# Patient Record
Sex: FEMALE | Race: WHITE | NOT HISPANIC OR LATINO | Employment: OTHER | URBAN - METROPOLITAN AREA
[De-identification: names, ages, dates, MRNs, and addresses within clinical notes are randomized per-mention and may not be internally consistent; named-entity substitution may affect disease eponyms.]

---

## 2017-08-07 ENCOUNTER — TRANSCRIBE ORDERS (OUTPATIENT)
Dept: ADMINISTRATIVE | Facility: HOSPITAL | Age: 57
End: 2017-08-07

## 2017-08-07 DIAGNOSIS — Z12.31 VISIT FOR SCREENING MAMMOGRAM: Primary | ICD-10-CM

## 2017-08-10 ENCOUNTER — TRANSCRIBE ORDERS (OUTPATIENT)
Dept: LAB | Facility: HOSPITAL | Age: 57
End: 2017-08-10

## 2017-08-10 ENCOUNTER — LAB (OUTPATIENT)
Dept: LAB | Facility: HOSPITAL | Age: 57
End: 2017-08-10

## 2017-08-10 DIAGNOSIS — Z01.419 PAP SMEAR, LOW-RISK: ICD-10-CM

## 2017-08-10 DIAGNOSIS — Z01.419 PAP SMEAR, LOW-RISK: Primary | ICD-10-CM

## 2017-08-10 LAB
25(OH)D3 SERPL-MCNC: 76.8 NG/ML
ARTICHOKE IGE QN: 94 MG/DL (ref 0–130)
CHOLEST SERPL-MCNC: 190 MG/DL (ref 0–200)
DEPRECATED RDW RBC AUTO: 46.9 FL (ref 37–54)
ERYTHROCYTE [DISTWIDTH] IN BLOOD BY AUTOMATED COUNT: 13.6 % (ref 11.3–14.5)
GLUCOSE BLD-MCNC: 88 MG/DL (ref 70–100)
HCT VFR BLD AUTO: 42.4 % (ref 34.5–44)
HDLC SERPL-MCNC: 78 MG/DL (ref 40–60)
HGB BLD-MCNC: 14 G/DL (ref 11.5–15.5)
MCH RBC QN AUTO: 31.2 PG (ref 27–31)
MCHC RBC AUTO-ENTMCNC: 33 G/DL (ref 32–36)
MCV RBC AUTO: 94.4 FL (ref 80–99)
PLATELET # BLD AUTO: 211 10*3/MM3 (ref 150–450)
PMV BLD AUTO: 10.6 FL (ref 6–12)
RBC # BLD AUTO: 4.49 10*6/MM3 (ref 3.89–5.14)
TRIGL SERPL-MCNC: 127 MG/DL (ref 0–150)
TSH SERPL DL<=0.05 MIU/L-ACNC: 1.55 MIU/ML (ref 0.35–5.35)
WBC NRBC COR # BLD: 6.37 10*3/MM3 (ref 3.5–10.8)

## 2017-08-10 PROCEDURE — 36415 COLL VENOUS BLD VENIPUNCTURE: CPT | Performed by: OBSTETRICS & GYNECOLOGY

## 2017-08-10 PROCEDURE — 82306 VITAMIN D 25 HYDROXY: CPT | Performed by: OBSTETRICS & GYNECOLOGY

## 2017-08-10 PROCEDURE — 84443 ASSAY THYROID STIM HORMONE: CPT | Performed by: OBSTETRICS & GYNECOLOGY

## 2017-08-10 PROCEDURE — 85027 COMPLETE CBC AUTOMATED: CPT | Performed by: OBSTETRICS & GYNECOLOGY

## 2017-08-10 PROCEDURE — 82947 ASSAY GLUCOSE BLOOD QUANT: CPT | Performed by: OBSTETRICS & GYNECOLOGY

## 2017-08-10 PROCEDURE — 80061 LIPID PANEL: CPT | Performed by: OBSTETRICS & GYNECOLOGY

## 2017-08-11 ENCOUNTER — APPOINTMENT (OUTPATIENT)
Dept: MAMMOGRAPHY | Facility: HOSPITAL | Age: 57
End: 2017-08-11
Attending: OBSTETRICS & GYNECOLOGY

## 2017-08-14 ENCOUNTER — TRANSCRIBE ORDERS (OUTPATIENT)
Dept: MAMMOGRAPHY | Facility: HOSPITAL | Age: 57
End: 2017-08-14

## 2017-08-14 DIAGNOSIS — Z13.820 SCREENING FOR OSTEOPOROSIS: Primary | ICD-10-CM

## 2017-08-21 ENCOUNTER — HOSPITAL ENCOUNTER (OUTPATIENT)
Dept: BONE DENSITY | Facility: HOSPITAL | Age: 57
Discharge: HOME | End: 2017-08-21
Attending: OBSTETRICS & GYNECOLOGY

## 2017-08-21 ENCOUNTER — APPOINTMENT (OUTPATIENT)
Dept: MAMMOGRAPHY | Facility: HOSPITAL | Age: 57
End: 2017-08-21
Attending: OBSTETRICS & GYNECOLOGY

## 2017-08-21 ENCOUNTER — HOSPITAL ENCOUNTER (OUTPATIENT)
Dept: MAMMOGRAPHY | Facility: HOSPITAL | Age: 57
Discharge: HOME | End: 2017-08-21
Attending: OBSTETRICS & GYNECOLOGY | Admitting: OBSTETRICS & GYNECOLOGY

## 2017-08-21 DIAGNOSIS — Z13.820 SCREENING FOR OSTEOPOROSIS: ICD-10-CM

## 2017-08-21 DIAGNOSIS — Z12.31 VISIT FOR SCREENING MAMMOGRAM: ICD-10-CM

## 2017-08-21 PROCEDURE — 77063 BREAST TOMOSYNTHESIS BI: CPT

## 2017-08-21 PROCEDURE — 77063 BREAST TOMOSYNTHESIS BI: CPT | Performed by: RADIOLOGY

## 2017-08-21 PROCEDURE — G0202 SCR MAMMO BI INCL CAD: HCPCS

## 2017-08-21 PROCEDURE — 77067 SCR MAMMO BI INCL CAD: CPT | Performed by: RADIOLOGY

## 2017-08-21 PROCEDURE — 77080 DXA BONE DENSITY AXIAL: CPT

## 2018-07-03 ENCOUNTER — TRANSCRIBE ORDERS (OUTPATIENT)
Dept: ADMINISTRATIVE | Facility: HOSPITAL | Age: 58
End: 2018-07-03

## 2018-07-03 DIAGNOSIS — Z12.31 VISIT FOR SCREENING MAMMOGRAM: Primary | ICD-10-CM

## 2018-08-28 ENCOUNTER — TRANSCRIBE ORDERS (OUTPATIENT)
Dept: ADMINISTRATIVE | Facility: HOSPITAL | Age: 58
End: 2018-08-28

## 2018-08-28 DIAGNOSIS — Z12.31 VISIT FOR SCREENING MAMMOGRAM: Primary | ICD-10-CM

## 2018-09-28 ENCOUNTER — HOSPITAL ENCOUNTER (OUTPATIENT)
Dept: MAMMOGRAPHY | Facility: HOSPITAL | Age: 58
Discharge: HOME | End: 2018-09-28
Attending: OBSTETRICS & GYNECOLOGY | Admitting: OBSTETRICS & GYNECOLOGY

## 2018-09-28 DIAGNOSIS — Z12.31 VISIT FOR SCREENING MAMMOGRAM: ICD-10-CM

## 2018-09-28 PROCEDURE — 77067 SCR MAMMO BI INCL CAD: CPT

## 2018-09-28 PROCEDURE — 77063 BREAST TOMOSYNTHESIS BI: CPT | Performed by: RADIOLOGY

## 2018-09-28 PROCEDURE — 77063 BREAST TOMOSYNTHESIS BI: CPT

## 2018-09-28 PROCEDURE — 77067 SCR MAMMO BI INCL CAD: CPT | Performed by: RADIOLOGY

## 2019-01-09 DIAGNOSIS — Z12.11 SCREENING FOR COLON CANCER: Primary | ICD-10-CM

## 2019-01-17 ENCOUNTER — OUTSIDE FACILITY SERVICE (OUTPATIENT)
Dept: GASTROENTEROLOGY | Facility: CLINIC | Age: 59
End: 2019-01-17

## 2019-01-17 PROCEDURE — G0121 COLON CA SCRN NOT HI RSK IND: HCPCS | Performed by: INTERNAL MEDICINE

## 2019-02-26 ENCOUNTER — OFFICE VISIT (OUTPATIENT)
Dept: INTERNAL MEDICINE | Facility: CLINIC | Age: 59
End: 2019-02-26

## 2019-02-26 VITALS
HEIGHT: 63 IN | DIASTOLIC BLOOD PRESSURE: 66 MMHG | BODY MASS INDEX: 20.73 KG/M2 | SYSTOLIC BLOOD PRESSURE: 94 MMHG | TEMPERATURE: 97.7 F | HEART RATE: 80 BPM | WEIGHT: 117 LBS

## 2019-02-26 DIAGNOSIS — J10.1 INFLUENZA A: Primary | ICD-10-CM

## 2019-02-26 DIAGNOSIS — R52 BODY ACHES: ICD-10-CM

## 2019-02-26 LAB
EXPIRATION DATE: ABNORMAL
FLUAV AG NPH QL: POSITIVE
FLUBV AG NPH QL: ABNORMAL
INTERNAL CONTROL: ABNORMAL
Lab: ABNORMAL

## 2019-02-26 PROCEDURE — 99213 OFFICE O/P EST LOW 20 MIN: CPT | Performed by: NURSE PRACTITIONER

## 2019-02-26 PROCEDURE — 87502 INFLUENZA DNA AMP PROBE: CPT | Performed by: NURSE PRACTITIONER

## 2019-02-26 RX ORDER — CHLORAL HYDRATE 500 MG
1000 CAPSULE ORAL
COMMUNITY

## 2019-02-26 RX ORDER — OSELTAMIVIR PHOSPHATE 75 MG/1
75 CAPSULE ORAL 2 TIMES DAILY
Qty: 10 CAPSULE | Refills: 0 | OUTPATIENT
Start: 2019-02-26 | End: 2019-03-12

## 2019-02-26 NOTE — PROGRESS NOTES
Miriam Godoy  1960  [id number]  Patient Care Team:  Bishun Riddle MD as PCP - General (Internal Medicine)    Miriam Godoy is a pleasant 58 y.o. female who presents for evaluation of Cough (productive cough with yellow sputum); Chills; and Generalized Body Aches (started this morning )      Chief Complaint   Patient presents with   • Cough     productive cough with yellow sputum   • Chills   • Generalized Body Aches     started this morning        HPI:   Onset of flu like sx this am including joint pain, productive cough, body aches.  Took 1 motrin today    Past Medical History:   Diagnosis Date   • Aortic root dilatation (CMS/HCC)    • Atypical chest pain    • History of echocardiogram 2015    showing estimated EF 55% to 60% with mild aortic regurgitation and moderate dilatation of the aortic root   • History of EKG     normal   • History of palpitations    • Osteopenia        Past Surgical History:   Procedure Laterality Date   • APPENDECTOMY     • OOPHORECTOMY Bilateral        Family History   Problem Relation Age of Onset   • Breast cancer Mother 43   • Breast cancer Paternal Grandmother 47   • Ovarian cancer Neg Hx        Social History     Tobacco Use   Smoking Status Former Smoker   • Types: Cigarettes   • Last attempt to quit:    • Years since quittin.1   Smokeless Tobacco Never Used       Allergies   Allergen Reactions   • Penicillins Rash       Review of Systems   Constitutional: Positive for chills. Negative for fatigue and fever.   HENT: Positive for congestion and sinus pressure. Negative for ear pain.    Respiratory: Positive for cough. Negative for chest tightness, shortness of breath and wheezing.    Cardiovascular: Negative for chest pain and palpitations.   Gastrointestinal: Negative for abdominal pain, blood in stool and constipation.   Skin: Negative for color change.   Allergic/Immunologic: Positive for environmental allergies.   Neurological: Negative for  "dizziness, speech difficulty and headache.   Psychiatric/Behavioral: Negative for decreased concentration. The patient is not nervous/anxious.        Vitals:    02/26/19 1550   BP: 94/66   BP Location: Left arm   Patient Position: Sitting   Cuff Size: Adult   Pulse: 80   Temp: 97.7 °F (36.5 °C)   TempSrc: Temporal   Weight: 53.1 kg (117 lb)   Height: 160 cm (62.99\")   PainSc: 0-No pain         Current Outpatient Medications:   •  BIOTIN PO, Take 1 tablet by mouth Daily., Disp: , Rfl:   •  CALCIUM-MAGNESIUM-ZINC PO, Take 1 tablet by mouth Daily., Disp: , Rfl:   •  Cholecalciferol (VITAMIN D-3) 5000 units tablet, Take 1 tablet by mouth Daily., Disp: , Rfl:   •  Omega-3 Fatty Acids (FISH OIL) 1000 MG capsule capsule, Take 1,000 mg by mouth Daily With Breakfast., Disp: , Rfl:   •  oseltamivir (TAMIFLU) 75 MG capsule, Take 1 capsule by mouth 2 (Two) Times a Day., Disp: 10 capsule, Rfl: 0    Physical Exam   Constitutional: She appears well-developed and well-nourished. She appears ill.   Pulmonary/Chest: Effort normal.   Skin: Skin is warm and dry.   Psychiatric: She has a normal mood and affect. Her behavior is normal.   Vitals reviewed.      Results Review:    I reviewed the patient's new clinical results.    Procedures    Assessment/Plan:    Problem List Items Addressed This Visit     None      Visit Diagnoses     Influenza A    -  Primary    Relevant Medications    oseltamivir (TAMIFLU) 75 MG capsule    Body aches        Relevant Orders    POCT Influenza A/B (Completed)          Plan of care reviewed with patient at the conclusion of today's visit. Education was provided regarding diagnosis, management and any prescribed or recommended OTC medications.  Patient verbalizes understanding of and agreement with management plan.    Return if symptoms worsen or fail to improve.    *Note that portions of this note were completed with a voice recognition program.  Efforts were made to edit the dictation but occasionally words " are transcribed.    Sarah Abreu, APRN

## 2019-03-12 PROCEDURE — 87086 URINE CULTURE/COLONY COUNT: CPT | Performed by: PHYSICIAN ASSISTANT

## 2019-03-14 ENCOUNTER — TELEPHONE (OUTPATIENT)
Dept: URGENT CARE | Facility: CLINIC | Age: 59
End: 2019-03-14

## 2019-03-21 ENCOUNTER — OFFICE VISIT (OUTPATIENT)
Dept: INTERNAL MEDICINE | Facility: CLINIC | Age: 59
End: 2019-03-21

## 2019-03-21 VITALS
TEMPERATURE: 96.8 F | WEIGHT: 116 LBS | HEIGHT: 63 IN | DIASTOLIC BLOOD PRESSURE: 62 MMHG | SYSTOLIC BLOOD PRESSURE: 90 MMHG | BODY MASS INDEX: 20.55 KG/M2 | HEART RATE: 64 BPM

## 2019-03-21 DIAGNOSIS — R21 RASH: Primary | ICD-10-CM

## 2019-03-21 DIAGNOSIS — R05.9 COUGH: ICD-10-CM

## 2019-03-21 PROCEDURE — 99213 OFFICE O/P EST LOW 20 MIN: CPT | Performed by: PHYSICIAN ASSISTANT

## 2019-03-21 PROCEDURE — 96372 THER/PROPH/DIAG INJ SC/IM: CPT | Performed by: PHYSICIAN ASSISTANT

## 2019-03-21 RX ORDER — TRIAMCINOLONE ACETONIDE 40 MG/ML
60 INJECTION, SUSPENSION INTRA-ARTICULAR; INTRAMUSCULAR ONCE
Status: COMPLETED | OUTPATIENT
Start: 2019-03-21 | End: 2019-03-21

## 2019-03-21 RX ADMIN — TRIAMCINOLONE ACETONIDE 60 MG: 40 INJECTION, SUSPENSION INTRA-ARTICULAR; INTRAMUSCULAR at 13:19

## 2019-03-21 NOTE — PROGRESS NOTES
Subjective   Miriam Godoy is a 58 y.o. female.     Past Medical History:   Diagnosis Date   • Aortic root dilatation (CMS/HCC)    • Atypical chest pain    • History of echocardiogram 2015    showing estimated EF 55% to 60% with mild aortic regurgitation and moderate dilatation of the aortic root   • History of EKG     normal   • History of palpitations    • Osteopenia       Past Surgical History:   Procedure Laterality Date   • APPENDECTOMY     • OOPHORECTOMY Bilateral       Family History   Problem Relation Age of Onset   • Breast cancer Mother 43   • Breast cancer Paternal Grandmother 47   • Ovarian cancer Neg Hx       Social History     Socioeconomic History   • Marital status: Single     Spouse name: Not on file   • Number of children: Not on file   • Years of education: Not on file   • Highest education level: Not on file   Tobacco Use   • Smoking status: Former Smoker     Types: Cigarettes     Last attempt to quit: 1981     Years since quittin.2   • Smokeless tobacco: Never Used   Substance and Sexual Activity   • Alcohol use: Yes     Comment: rare   • Drug use: No   • Sexual activity: Defer      Allergies   Allergen Reactions   • Penicillins Rash      Immunization History   Administered Date(s) Administered   • Flu Vaccine High Dose PF 65YR+ 2016   • Flu Vaccine Quad PF >36MO 2017   • Tdap 2016        HPI  Patient Active Problem List   Diagnosis   • Premature ventricular contractions (PVCs) (VPCs)       Rash   This is a new problem. The current episode started yesterday. The problem is unchanged. The affected locations include the left arm, left lower leg, right wrist and left wrist. The rash is characterized by redness. She was exposed to chemicals. Associated symptoms include congestion, coughing, rhinorrhea and a sore throat. Pertinent negatives include no fatigue, fever or shortness of breath. Past treatments include nothing. Her past medical history is significant for  allergies.   Cough   This is a recurrent problem. The current episode started 1 to 4 weeks ago. The problem has been unchanged. The cough is non-productive. Associated symptoms include postnasal drip, a rash, rhinorrhea, a sore throat and wheezing. Pertinent negatives include no chest pain, chills, ear pain, fever or shortness of breath. The treatment provided no relief. Her past medical history is significant for environmental allergies.        Review of Systems   Constitutional: Negative for chills, fatigue and fever.   HENT: Positive for congestion, postnasal drip, rhinorrhea, sneezing, sore throat and voice change. Negative for ear pain and sinus pressure.    Respiratory: Positive for cough and wheezing. Negative for chest tightness and shortness of breath.    Cardiovascular: Negative for chest pain and palpitations.   Gastrointestinal: Negative for abdominal pain, blood in stool and constipation.   Skin: Positive for rash. Negative for color change.   Allergic/Immunologic: Positive for environmental allergies.   Neurological: Negative for dizziness, speech difficulty and headache.   Psychiatric/Behavioral: Negative for decreased concentration. The patient is not nervous/anxious.        Objective   Physical Exam   Constitutional: She appears well-developed and well-nourished. No distress.   HENT:   Head: Normocephalic and atraumatic.   Nose: Nose normal.   Mouth/Throat: Oropharynx is clear and moist.   Eyes: Conjunctivae and EOM are normal. Pupils are equal, round, and reactive to light.   Neck: Normal range of motion. Neck supple. No JVD present.   Cardiovascular: Normal rate, regular rhythm and normal heart sounds.   No murmur heard.  Pulmonary/Chest: Effort normal. No stridor. No respiratory distress. She has wheezes.   Musculoskeletal: She exhibits no edema.   Lymphadenopathy:     She has no cervical adenopathy.   Skin: Skin is warm. Rash noted. She is not diaphoretic.   Psychiatric: She has a normal mood  and affect. Her behavior is normal. Thought content normal.   Nursing note and vitals reviewed.      Procedures    Assessment/Plan   Problem List Items Addressed This Visit     None      Visit Diagnoses     Rash    -  Primary    Relevant Medications    triamcinolone acetonide (KENALOG-40) injection 60 mg (Start on 3/21/2019  2:00 PM)    Cough        Relevant Medications    triamcinolone acetonide (KENALOG-40) injection 60 mg (Start on 3/21/2019  2:00 PM)                 Pt's social, medical, and family history reviewed and updated.    Plan of care reviewed with patient at the conclusion of today's visit. Education was provided regarding diagnosis, management and any prescribed or recommended OTC medications. Patient verbalizes understanding of and agreement with management plan.     Return if symptoms worsen or fail to improve.

## 2019-04-15 PROBLEM — H00.029 HORDEOLUM INTERNUM: Status: ACTIVE | Noted: 2019-04-15

## 2019-04-15 PROBLEM — Z00.00 ANNUAL PHYSICAL EXAM: Status: ACTIVE | Noted: 2019-04-15

## 2019-04-15 PROBLEM — J34.89 INTERNAL NASAL LESION: Status: ACTIVE | Noted: 2019-04-15

## 2019-04-15 PROBLEM — J30.9 CHRONIC ALLERGIC RHINITIS: Status: ACTIVE | Noted: 2019-04-15

## 2019-04-15 PROBLEM — R21 RASH: Status: ACTIVE | Noted: 2019-04-15

## 2019-04-17 ENCOUNTER — OFFICE VISIT (OUTPATIENT)
Dept: INTERNAL MEDICINE | Facility: CLINIC | Age: 59
End: 2019-04-17

## 2019-04-17 VITALS
BODY MASS INDEX: 20.55 KG/M2 | WEIGHT: 116 LBS | DIASTOLIC BLOOD PRESSURE: 58 MMHG | SYSTOLIC BLOOD PRESSURE: 90 MMHG | HEIGHT: 63 IN | HEART RATE: 60 BPM

## 2019-04-17 DIAGNOSIS — R07.89 ATYPICAL CHEST PAIN: Primary | ICD-10-CM

## 2019-04-17 PROCEDURE — 99213 OFFICE O/P EST LOW 20 MIN: CPT | Performed by: INTERNAL MEDICINE

## 2019-04-17 NOTE — PROGRESS NOTES
[facility]     Miriam Godoy  1960   [id number]      Patient Care Team:  Bishnu Riddle MD as PCP - General (Internal Medicine)    Chief Complaint::   Chief Complaint   Patient presents with   • Allergic Rhinitis   • Pain     neck, chest, arm, leg - left side   • Weight Loss   • Urinary Frequency        HPI  Mrs. Godoy is now 58 years old.  She comes in concerned about 3 episodes of chest discomfort.  One occurred while she was driving her car, beginning in the left neck and shoulder and then extending into the chest.  There is no shortness of breath lightheadedness.  The pain was sharp and transient.  The second episode occurred while she was in bed and again was somewhat sharp, and then a dull ache, again without shortness of breath.  The third episode was similar again occurring at rest.  She admits that she is under a lot of stress.  She was stressed during this.  Getting her grandchildren ready to go for spring break.  Despite this she continues to train for a 5K, and is able to exercise vigorously most days without any symptoms of chest pain or dyspnea.  There is no orthopnea or edema however she does have a fairly strong family history of coronary artery disease.    Chronic Conditions:      Patient Active Problem List   Diagnosis   • Premature ventricular contractions (PVCs) (VPCs)   • Rash   • Chronic allergic rhinitis   • Internal nasal lesion   • Hordeolum internum   • Annual physical exam        Past Medical History:   Diagnosis Date   • Aortic root dilatation (CMS/HCC)    • Atypical chest pain    • History of echocardiogram 04/16/2015    showing estimated EF 55% to 60% with mild aortic regurgitation and moderate dilatation of the aortic root   • History of EKG     normal   • History of palpitations    • Osteopenia        Past Surgical History:   Procedure Laterality Date   • APPENDECTOMY     • OOPHORECTOMY Bilateral        Family History   Problem Relation Age of Onset   •  Breast cancer Mother 43   • Breast cancer Paternal Grandmother 47   • Coronary artery disease Father 57        premature   • Ovarian cancer Neg Hx        Social History     Socioeconomic History   • Marital status: Single     Spouse name: Not on file   • Number of children: Not on file   • Years of education: Not on file   • Highest education level: Not on file   Tobacco Use   • Smoking status: Former Smoker     Types: Cigarettes     Last attempt to quit:      Years since quittin.3   • Smokeless tobacco: Never Used   Substance and Sexual Activity   • Alcohol use: Yes     Comment: rare   • Drug use: No   • Sexual activity: Defer       Allergies   Allergen Reactions   • Penicillins Rash         Current Outpatient Medications:   •  BIOTIN PO, Take 1 tablet by mouth Daily., Disp: , Rfl:   •  CALCIUM-MAGNESIUM-ZINC PO, Take 1 tablet by mouth Daily., Disp: , Rfl:   •  Cholecalciferol (VITAMIN D-3) 5000 units tablet, Take 1 tablet by mouth Daily., Disp: , Rfl:   •  Multiple Vitamins-Minerals (MULTIVITAMIN ADULT PO), Take 1 tablet by mouth Daily., Disp: , Rfl:   •  Omega-3 Fatty Acids (FISH OIL) 1000 MG capsule capsule, Take 1,000 mg by mouth Daily With Breakfast., Disp: , Rfl:     Review of Systems   Constitutional: Negative for chills, fatigue and fever.   HENT: Negative for congestion, ear pain and sinus pressure.    Respiratory: Negative for cough, chest tightness, shortness of breath and wheezing.    Cardiovascular: Negative for chest pain and palpitations.   Gastrointestinal: Negative for abdominal pain, blood in stool and constipation.   Skin: Negative for color change.   Allergic/Immunologic: Negative for environmental allergies.   Neurological: Negative for dizziness, speech difficulty and headache.   Psychiatric/Behavioral: Negative for decreased concentration. The patient is not nervous/anxious.         Vital Signs  Vitals:    19 1541   BP: 90/58   BP Location: Right arm   Patient Position: Sitting  "  Cuff Size: Adult   Pulse: 60   Weight: 52.6 kg (116 lb)   Height: 160 cm (62.99\")       Physical Exam   Constitutional: She is oriented to person, place, and time. She appears well-developed and well-nourished.   HENT:   Head: Normocephalic and atraumatic.   Neck: Normal range of motion. Neck supple. No JVD present. No thyromegaly present.   Cardiovascular: Normal rate, regular rhythm and normal heart sounds.   No murmur heard.  Pulmonary/Chest: Effort normal and breath sounds normal.   Neurological: She is alert and oriented to person, place, and time.   Psychiatric: She has a normal mood and affect.   Vitals reviewed.     Procedures    ACE III MINI             Assessment/Plan:    Atypical chest pain, occurring without shortness of breath and at rest.  In addition, she exercises vigorously at other times without any symptoms or limitations.  Given these historical details I think it is extremely unlikely that this represents coronary artery disease.  We discussed this at length and I agree that a stress test at this point would be very low yield.  However if symptoms worsen or persist, would not hesitate to proceed to cardiac workup.    Plan of care reviewed with patient at the conclusion of today's visit. Education was provided regarding diagnosis, management, and any prescribed or recommended OTC medications.Patient verbalizes understanding of and agreement with management plan.         Bishnu Riddle MD           "

## 2019-06-05 ENCOUNTER — TRANSCRIBE ORDERS (OUTPATIENT)
Dept: ADMINISTRATIVE | Facility: HOSPITAL | Age: 59
End: 2019-06-05

## 2019-06-05 DIAGNOSIS — Z12.31 VISIT FOR SCREENING MAMMOGRAM: Primary | ICD-10-CM

## 2019-09-11 ENCOUNTER — OFFICE VISIT (OUTPATIENT)
Dept: INTERNAL MEDICINE | Facility: CLINIC | Age: 59
End: 2019-09-11

## 2019-09-11 VITALS
HEIGHT: 63 IN | HEART RATE: 64 BPM | DIASTOLIC BLOOD PRESSURE: 60 MMHG | BODY MASS INDEX: 20.55 KG/M2 | WEIGHT: 116 LBS | SYSTOLIC BLOOD PRESSURE: 106 MMHG

## 2019-09-11 DIAGNOSIS — E55.9 VITAMIN D DEFICIENCY: Primary | ICD-10-CM

## 2019-09-11 DIAGNOSIS — J30.9 CHRONIC ALLERGIC RHINITIS: ICD-10-CM

## 2019-09-11 DIAGNOSIS — Z00.00 ANNUAL PHYSICAL EXAM: ICD-10-CM

## 2019-09-11 DIAGNOSIS — I49.3 PREMATURE VENTRICULAR CONTRACTIONS (PVCS) (VPCS): ICD-10-CM

## 2019-09-11 DIAGNOSIS — Z13.220 SCREENING FOR CHOLESTEROL LEVEL: ICD-10-CM

## 2019-09-11 DIAGNOSIS — Z13.228 ENCOUNTER FOR SCREENING FOR OTHER METABOLIC DISORDERS: ICD-10-CM

## 2019-09-11 PROCEDURE — 99396 PREV VISIT EST AGE 40-64: CPT | Performed by: INTERNAL MEDICINE

## 2019-09-11 NOTE — PROGRESS NOTES
[facility]     Miriam Godoy  1960   [id number]      Patient Care Team:  Bishnu Riddle MD as PCP - General (Internal Medicine)    Chief Complaint::   Chief Complaint   Patient presents with   • Annual Exam        HPI  Mrs. Godoy is now 58.  She comes in for follow-up of her PVCs and for annual examination.  She feels well and has no complaints.  She continues to exercise regularly.  She sees her gynecologist and is up-to-date on mammography and colonoscopy.  She still meditates daily.  She was recently found to have small uterine fibroids which cause some discomfort but not limiting.  She has no other complaints today.    Chronic Conditions:      Patient Active Problem List   Diagnosis   • Premature ventricular contractions (PVCs) (VPCs)   • Rash   • Chronic allergic rhinitis   • Internal nasal lesion   • Hordeolum internum   • Annual physical exam        Past Medical History:   Diagnosis Date   • Aortic root dilatation (CMS/HCC)    • Atypical chest pain    • History of echocardiogram 2015    showing estimated EF 55% to 60% with mild aortic regurgitation and moderate dilatation of the aortic root   • History of EKG     normal   • History of palpitations    • Osteopenia        Past Surgical History:   Procedure Laterality Date   • APPENDECTOMY     • OOPHORECTOMY Bilateral        Family History   Problem Relation Age of Onset   • Breast cancer Mother 43   • Breast cancer Paternal Grandmother 47   • Coronary artery disease Father 57        premature   • Ovarian cancer Neg Hx        Social History     Socioeconomic History   • Marital status: Single     Spouse name: Not on file   • Number of children: Not on file   • Years of education: Not on file   • Highest education level: Not on file   Tobacco Use   • Smoking status: Former Smoker     Types: Cigarettes     Last attempt to quit:      Years since quittin.7   • Smokeless tobacco: Never Used   Substance and Sexual Activity  "  • Alcohol use: Yes     Comment: rare   • Drug use: No   • Sexual activity: Defer       Allergies   Allergen Reactions   • Penicillins Rash         Current Outpatient Medications:   •  BIOTIN PO, Take 1 tablet by mouth Daily., Disp: , Rfl:   •  CALCIUM-MAGNESIUM-ZINC PO, Take 1 tablet by mouth Daily., Disp: , Rfl:   •  Multiple Vitamins-Minerals (MULTIVITAMIN ADULT PO), Take 1 tablet by mouth Daily., Disp: , Rfl:   •  Omega-3 Fatty Acids (FISH OIL) 1000 MG capsule capsule, Take 1,000 mg by mouth Daily With Breakfast., Disp: , Rfl:   •  Cholecalciferol (VITAMIN D-3) 5000 units tablet, Take 1 tablet by mouth Daily., Disp: , Rfl:     Immunization History   Administered Date(s) Administered   • Flu Vaccine High Dose PF 65YR+ 01/05/2016   • Flu Vaccine Quad PF >36MO 09/06/2017, 12/17/2018   • Hepatitis A 02/17/2018, 08/17/2018   • Tdap 01/05/2016        Health Maintenance Due   Topic Date Due   • ZOSTER VACCINE (1 of 2) 11/06/2008   • HEPATITIS C SCREENING  08/01/2016   • PAP SMEAR  08/01/2016   • INFLUENZA VACCINE  08/01/2019   • DXA SCAN  08/21/2019   • ANNUAL PHYSICAL  09/11/2019        Review of Systems   Constitutional: Negative for chills, fatigue and fever.   HENT: Negative for congestion, ear pain and sinus pressure.    Respiratory: Negative for cough, chest tightness, shortness of breath and wheezing.    Cardiovascular: Negative for chest pain and palpitations.   Gastrointestinal: Negative for abdominal pain, blood in stool and constipation.   Skin: Negative for color change.   Allergic/Immunologic: Negative for environmental allergies.   Neurological: Negative for dizziness, speech difficulty and headache.   Psychiatric/Behavioral: Negative for decreased concentration. The patient is not nervous/anxious.         Vital Signs  Vitals:    09/11/19 1622   BP: 106/60   BP Location: Left arm   Patient Position: Sitting   Cuff Size: Adult   Pulse: 64   Weight: 52.6 kg (116 lb)   Height: 160 cm (62.99\")   PainSc:   4 "   PainLoc: Neck       Physical Exam   Constitutional: She is oriented to person, place, and time. She appears well-developed and well-nourished.   HENT:   Head: Normocephalic and atraumatic.   Right Ear: External ear normal.   Left Ear: External ear normal.   Nose: Nose normal.   Mouth/Throat: Oropharynx is clear and moist. No oropharyngeal exudate.   Eyes: Conjunctivae and EOM are normal. Pupils are equal, round, and reactive to light.   Neck: Normal range of motion. Neck supple. No JVD present. No thyromegaly present.   Cardiovascular: Normal rate, regular rhythm, normal heart sounds and intact distal pulses. Exam reveals no gallop and no friction rub.   No murmur heard.  Pulmonary/Chest: Effort normal and breath sounds normal. No respiratory distress. She has no wheezes. She has no rales. She exhibits no tenderness.   Abdominal: Soft. Bowel sounds are normal. She exhibits no distension and no mass. There is no tenderness. There is no rebound and no guarding. No hernia.   Musculoskeletal: Normal range of motion. She exhibits no edema or tenderness.   Lymphadenopathy:     She has no cervical adenopathy.   Neurological: She is alert and oriented to person, place, and time. She displays normal reflexes. No cranial nerve deficit or sensory deficit. She exhibits normal muscle tone. Coordination normal.   Skin: Skin is warm and dry. No rash noted. No erythema.   Psychiatric: She has a normal mood and affect. Her behavior is normal. Judgment and thought content normal.   Nursing note and vitals reviewed.     Procedures     Fall Risk Screen:  [facility] Fall Risk Assessment has not been completed.    Health Habits and Functional and Cognitive Screening:  No flowsheet data found.    Depression [place]  PHQ-9 Total Score: 0     ACE III MINI             Assessment/Plan:    Miriam was seen today for annual exam.    Diagnoses and all orders for this visit:    Vitamin D deficiency  -     Vitamin D 25 Hydroxy; Future    Screening for  cholesterol level  -     Lipid Panel; Future    Encounter for screening for other metabolic disorders  -     Comprehensive Metabolic Panel; Future    Annual physical exam    Chronic allergic rhinitis    Premature ventricular contractions (PVCs) (VPCs)    Plan    She remains in excellent health with good lifestyle habits.  She is up-to-date on colonoscopy, mammography and GYN care.        Labs  Results for orders placed or performed during the hospital encounter of 03/12/19   Urine Culture - Urine, Urine, Clean Catch   Result Value Ref Range    Urine Culture No growth at 2 days    POC Urinalysis Dipstick, Multipro (Automated dipstick)   Result Value Ref Range    Color Yellow Yellow, Straw, Dark Yellow, Emelia    Clarity, UA Clear Clear    Glucose, UA Negative Negative, 1000 mg/dL (3+) mg/dL    Bilirubin Negative Negative    Ketones, UA Negative Negative    Specific Gravity  1.010 1.005 - 1.030    Blood, UA Negative Negative    pH, Urine 8.0 5.0 - 8.0    Protein, POC Negative Negative mg/dL    Urobilinogen, UA Normal Normal    Nitrite, UA Negative Negative    Leukocytes 25 Jade/ul (A) Negative        Counseling was given to patient for the following topics: appropriate exercise as she is doing, disease prevention and healthy eating habits.    Plan of care reviewed with patient at the conclusion of today's visit. Education was provided regarding diagnosis, management, and any prescribed or recommended OTC medications.Patient verbalizes understanding of and agreement with management plan.         Bishnu Riddle MD

## 2019-09-26 ENCOUNTER — LAB (OUTPATIENT)
Dept: LAB | Facility: HOSPITAL | Age: 59
End: 2019-09-26

## 2019-09-26 DIAGNOSIS — E55.9 VITAMIN D DEFICIENCY: ICD-10-CM

## 2019-09-26 DIAGNOSIS — Z13.220 SCREENING FOR CHOLESTEROL LEVEL: ICD-10-CM

## 2019-09-26 DIAGNOSIS — Z13.228 ENCOUNTER FOR SCREENING FOR OTHER METABOLIC DISORDERS: ICD-10-CM

## 2019-09-26 LAB
25(OH)D3 SERPL-MCNC: 41.4 NG/ML (ref 30–100)
ALBUMIN SERPL-MCNC: 4.6 G/DL (ref 3.5–5.2)
ALBUMIN/GLOB SERPL: 2 G/DL
ALP SERPL-CCNC: 84 U/L (ref 39–117)
ALT SERPL W P-5'-P-CCNC: 13 U/L (ref 1–33)
ANION GAP SERPL CALCULATED.3IONS-SCNC: 12.3 MMOL/L (ref 5–15)
AST SERPL-CCNC: 21 U/L (ref 1–32)
BILIRUB SERPL-MCNC: 0.4 MG/DL (ref 0.2–1.2)
BUN BLD-MCNC: 19 MG/DL (ref 8–23)
BUN/CREAT SERPL: 19.4 (ref 7–25)
CALCIUM SPEC-SCNC: 9.7 MG/DL (ref 8.6–10.5)
CHLORIDE SERPL-SCNC: 101 MMOL/L (ref 98–107)
CHOLEST SERPL-MCNC: 179 MG/DL (ref 0–200)
CO2 SERPL-SCNC: 29.7 MMOL/L (ref 22–29)
CREAT BLD-MCNC: 0.98 MG/DL (ref 0.57–1)
GFR SERPL CREATININE-BSD FRML MDRD: 58 ML/MIN/1.73
GLOBULIN UR ELPH-MCNC: 2.3 GM/DL
GLUCOSE BLD-MCNC: 96 MG/DL (ref 65–99)
HDLC SERPL-MCNC: 70 MG/DL (ref 40–60)
LDLC SERPL CALC-MCNC: 98 MG/DL (ref 0–100)
LDLC/HDLC SERPL: 1.41 {RATIO}
POTASSIUM BLD-SCNC: 4.4 MMOL/L (ref 3.5–5.2)
PROT SERPL-MCNC: 6.9 G/DL (ref 6–8.5)
SODIUM BLD-SCNC: 143 MMOL/L (ref 136–145)
TRIGL SERPL-MCNC: 53 MG/DL (ref 0–150)
VLDLC SERPL-MCNC: 10.6 MG/DL (ref 5–40)

## 2019-09-26 PROCEDURE — 80053 COMPREHEN METABOLIC PANEL: CPT

## 2019-09-26 PROCEDURE — 82306 VITAMIN D 25 HYDROXY: CPT

## 2019-09-26 PROCEDURE — 80061 LIPID PANEL: CPT

## 2019-09-30 ENCOUNTER — HOSPITAL ENCOUNTER (OUTPATIENT)
Dept: MAMMOGRAPHY | Facility: HOSPITAL | Age: 59
Discharge: HOME | End: 2019-09-30
Admitting: OBSTETRICS & GYNECOLOGY

## 2019-09-30 DIAGNOSIS — Z12.31 VISIT FOR SCREENING MAMMOGRAM: ICD-10-CM

## 2019-09-30 PROCEDURE — 77063 BREAST TOMOSYNTHESIS BI: CPT | Performed by: RADIOLOGY

## 2019-09-30 PROCEDURE — 77067 SCR MAMMO BI INCL CAD: CPT | Performed by: RADIOLOGY

## 2019-09-30 PROCEDURE — 77067 SCR MAMMO BI INCL CAD: CPT

## 2019-09-30 PROCEDURE — 77063 BREAST TOMOSYNTHESIS BI: CPT

## 2019-11-12 ENCOUNTER — OFFICE VISIT (OUTPATIENT)
Dept: INTERNAL MEDICINE | Facility: CLINIC | Age: 59
End: 2019-11-12

## 2019-11-12 VITALS
TEMPERATURE: 97.4 F | HEIGHT: 63 IN | SYSTOLIC BLOOD PRESSURE: 98 MMHG | BODY MASS INDEX: 20.55 KG/M2 | HEART RATE: 56 BPM | WEIGHT: 116 LBS | DIASTOLIC BLOOD PRESSURE: 68 MMHG

## 2019-11-12 DIAGNOSIS — R22.1 LOCALIZED SWELLING, MASS OR LUMP OF NECK: Primary | ICD-10-CM

## 2019-11-12 PROCEDURE — 99213 OFFICE O/P EST LOW 20 MIN: CPT | Performed by: NURSE PRACTITIONER

## 2019-11-12 NOTE — PROGRESS NOTES
"Miriam Godoy  1960  [id number]  Patient Care Team:  Bishnu Riddle MD as PCP - General (Internal Medicine)    Miriam Godoy is a pleasant 59 y.o. female who presents for evaluation of No chief complaint on file.    Chief complaint: Painful chewing, knot in neck    HPI:   Abnormal lesion x1 month.  Feels like it is under tongue right side.  But she also feels a small \"knot \"in her neck right side.  History of allergies, uses [employer] pot   Read online this could be a salivary stone and has been checking on renu which seems to help some.  The knot is not painful, she denies night sweats, fevers, swelling in her axilla or groin.  She does not have any cough, shortness of breath, wheezing.  No abdominal pain.  She had a recent visit with the dentist who did not see anything of concern.  It is painful sometimes to chew on the right side.    Remote smoking history half pack a day x9 years.  Past Medical History:   Diagnosis Date   • Aortic root dilatation (CMS/HCC)    • Atypical chest pain    • History of echocardiogram 2015    showing estimated EF 55% to 60% with mild aortic regurgitation and moderate dilatation of the aortic root   • History of EKG     normal   • History of palpitations    • Osteopenia      Past Surgical History:   Procedure Laterality Date   • APPENDECTOMY     • OOPHORECTOMY Bilateral      Family History   Problem Relation Age of Onset   • Breast cancer Mother 43   • Breast cancer Paternal Grandmother 47   • Coronary artery disease Father 57        premature   • Ovarian cancer Neg Hx      Social History     Tobacco Use   Smoking Status Former Smoker   • Types: Cigarettes   • Last attempt to quit:    • Years since quittin.8   Smokeless Tobacco Never Used     Allergies   Allergen Reactions   • Penicillins Rash       Current Outpatient Medications:   •  BIOTIN PO, Take 1 tablet by mouth Daily., Disp: , Rfl:   •  CALCIUM-MAGNESIUM-ZINC PO, Take 1 tablet by mouth Daily., Disp: , " "Rfl:   •  Cholecalciferol (VITAMIN D-3) 5000 units tablet, Take 1 tablet by mouth Daily., Disp: , Rfl:   •  Multiple Vitamins-Minerals (MULTIVITAMIN ADULT PO), Take 1 tablet by mouth Daily., Disp: , Rfl:   •  Omega-3 Fatty Acids (FISH OIL) 1000 MG capsule capsule, Take 1,000 mg by mouth Daily With Breakfast., Disp: , Rfl:     Review of Systems   Constitutional: Negative for chills, fatigue and fever.   HENT: Negative for congestion, ear pain and sinus pressure.    Respiratory: Negative for cough, chest tightness, shortness of breath and wheezing.    Cardiovascular: Negative for chest pain and palpitations.   Gastrointestinal: Negative for abdominal pain, blood in stool and constipation.   Skin: Negative for color change.   Allergic/Immunologic: Negative for environmental allergies.   Neurological: Negative for dizziness, speech difficulty and headache.   Psychiatric/Behavioral: Negative for decreased concentration. The patient is not nervous/anxious.      BP 98/68 (BP Location: Left arm, Patient Position: Sitting, Cuff Size: Adult)   Pulse 56   Temp 97.4 °F (36.3 °C) (Temporal)   Ht 160 cm (62.99\")   Wt 52.6 kg (116 lb)   BMI 20.55 kg/m²     Physical Exam   Constitutional: She appears well-developed and well-nourished.   HENT:   Head: Normocephalic and atraumatic.   Right Ear: External ear normal.   Left Ear: External ear normal.   Mouth/Throat: Mucous membranes are normal. Normal dentition. No oropharyngeal exudate, posterior oropharyngeal edema or posterior oropharyngeal erythema.   Eyes: Conjunctivae and EOM are normal.   Neck: Normal range of motion. Neck supple.       Cardiovascular: Normal rate, regular rhythm and normal heart sounds.   Pulmonary/Chest: Effort normal and breath sounds normal.   Musculoskeletal: Normal range of motion.   Lymphadenopathy:     She has cervical adenopathy.        Right cervical: Superficial cervical adenopathy present. No deep cervical and no posterior cervical adenopathy " present.       Left cervical: Superficial cervical adenopathy present. No deep cervical and no posterior cervical adenopathy present.   Neurological: She is alert.   Skin: Skin is warm and dry.   Psychiatric: She has a normal mood and affect. Her behavior is normal. Thought content normal.       Results Review:  None    Assessment/Plan:  Diagnoses and all orders for this visit:    Localized swelling, mass or lump of neck       Patient Instructions   Refer to ENT for further evaluation and treatment.    Plan of care reviewed with patient at the conclusion of today's visit. Education was provided regarding diagnosis, management and any prescribed or recommended OTC medications.  Patient verbalizes understanding of and agreement with management plan.    Return if symptoms worsen or fail to improve.    *Note that portions of this note were completed with a voice recognition program.  Efforts were made to edit the dictation but occasionally words are transcribed.    JUVENTINO Lock

## 2020-01-27 ENCOUNTER — TELEPHONE (OUTPATIENT)
Dept: INTERNAL MEDICINE | Facility: CLINIC | Age: 60
End: 2020-01-27

## 2020-01-27 NOTE — TELEPHONE ENCOUNTER
Please call and clarify.  Does she still have a lump under tongue?  Is it better or worse?  I have Dr Kang's note, but not the ultrasound report.  Did the ultrasound show anything?

## 2020-01-27 NOTE — TELEPHONE ENCOUNTER
PT called with an update and request for PCP. States she saw Dr. Kang (ENT at [facility]), who performed an ultrasound of the neck on the lump PT has developed.     PT states she wasn't necessarily pleased and would like another referral for a second opinion if possible.    Please call Miriam back: [phone]

## 2020-01-28 NOTE — TELEPHONE ENCOUNTER
Called pt again. She said the lump is still there - no change. She notices it more when she is chewing. The US was normal. She said her nephew had some type of bug bite and had similar issues. He was given an antibiotic and it resolved. Advised Dr. Riddle is out of the office until Thurs, but I will forward her message

## 2020-01-30 ENCOUNTER — TELEPHONE (OUTPATIENT)
Dept: INTERNAL MEDICINE | Facility: CLINIC | Age: 60
End: 2020-01-30

## 2020-01-30 RX ORDER — CLINDAMYCIN HYDROCHLORIDE 300 MG/1
300 CAPSULE ORAL 3 TIMES DAILY
Qty: 21 CAPSULE | Refills: 0 | Status: SHIPPED | OUTPATIENT
Start: 2020-01-30 | End: 2020-05-11

## 2020-01-30 NOTE — TELEPHONE ENCOUNTER
Patient is returning Sirisha's call regarding a second opinion with an ENT @ [facility]. She will be available for a call back at 11:50 am. If not, ok to leave a message with the appointment information.

## 2020-01-30 NOTE — TELEPHONE ENCOUNTER
Seems a strange place for a bug bite, but I'm not opposed to a course of antibiotics.  Since she is allergic to PCN, I sent clindamycin to pharmacy.  If it's not better after that she should let usknow.

## 2020-02-05 ENCOUNTER — TELEPHONE (OUTPATIENT)
Dept: INTERNAL MEDICINE | Facility: CLINIC | Age: 60
End: 2020-02-05

## 2020-02-05 DIAGNOSIS — K13.70 ORAL LESION: Primary | ICD-10-CM

## 2020-02-05 NOTE — TELEPHONE ENCOUNTER
Called patient advised per note she said someone has already called and set up the appt for Dr. Herndon  for 2/25/20 at 11:15

## 2020-02-05 NOTE — TELEPHONE ENCOUNTER
Pt says she was seen last year by an ENT about bump on inside of her mouth.  Pt is wanting a referral to a different specialist to get a second opinion.  Pt request call back to discuss options.

## 2020-05-11 ENCOUNTER — LAB (OUTPATIENT)
Dept: LAB | Facility: HOSPITAL | Age: 60
End: 2020-05-11

## 2020-05-11 ENCOUNTER — OFFICE VISIT (OUTPATIENT)
Dept: INTERNAL MEDICINE | Facility: CLINIC | Age: 60
End: 2020-05-11

## 2020-05-11 VITALS
TEMPERATURE: 97.7 F | WEIGHT: 116 LBS | DIASTOLIC BLOOD PRESSURE: 64 MMHG | SYSTOLIC BLOOD PRESSURE: 104 MMHG | HEIGHT: 63 IN | HEART RATE: 64 BPM | BODY MASS INDEX: 20.55 KG/M2

## 2020-05-11 DIAGNOSIS — R10.84 GENERALIZED ABDOMINAL PAIN: Primary | ICD-10-CM

## 2020-05-11 DIAGNOSIS — R10.84 GENERALIZED ABDOMINAL PAIN: ICD-10-CM

## 2020-05-11 LAB
ALBUMIN SERPL-MCNC: 4.4 G/DL (ref 3.5–5.2)
ALBUMIN/GLOB SERPL: 1.5 G/DL
ALP SERPL-CCNC: 78 U/L (ref 39–117)
ALT SERPL W P-5'-P-CCNC: 16 U/L (ref 1–33)
AMYLASE SERPL-CCNC: 81 U/L (ref 28–100)
ANION GAP SERPL CALCULATED.3IONS-SCNC: 14.6 MMOL/L (ref 5–15)
AST SERPL-CCNC: 17 U/L (ref 1–32)
BASOPHILS # BLD AUTO: 0.05 10*3/MM3 (ref 0–0.2)
BASOPHILS NFR BLD AUTO: 0.8 % (ref 0–1.5)
BILIRUB SERPL-MCNC: 0.4 MG/DL (ref 0.2–1.2)
BUN BLD-MCNC: 20 MG/DL (ref 8–23)
BUN/CREAT SERPL: 21.1 (ref 7–25)
CALCIUM SPEC-SCNC: 10 MG/DL (ref 8.6–10.5)
CHLORIDE SERPL-SCNC: 98 MMOL/L (ref 98–107)
CO2 SERPL-SCNC: 26.4 MMOL/L (ref 22–29)
CREAT BLD-MCNC: 0.95 MG/DL (ref 0.57–1)
DEPRECATED RDW RBC AUTO: 42.4 FL (ref 37–54)
EOSINOPHIL # BLD AUTO: 0.02 10*3/MM3 (ref 0–0.4)
EOSINOPHIL NFR BLD AUTO: 0.3 % (ref 0.3–6.2)
ERYTHROCYTE [DISTWIDTH] IN BLOOD BY AUTOMATED COUNT: 12.1 % (ref 12.3–15.4)
GFR SERPL CREATININE-BSD FRML MDRD: 60 ML/MIN/1.73
GLOBULIN UR ELPH-MCNC: 3 GM/DL
GLUCOSE BLD-MCNC: 107 MG/DL (ref 65–99)
HCT VFR BLD AUTO: 44.4 % (ref 34–46.6)
HGB BLD-MCNC: 14.9 G/DL (ref 12–15.9)
IMM GRANULOCYTES # BLD AUTO: 0.01 10*3/MM3 (ref 0–0.05)
IMM GRANULOCYTES NFR BLD AUTO: 0.2 % (ref 0–0.5)
LIPASE SERPL-CCNC: 20 U/L (ref 13–60)
LYMPHOCYTES # BLD AUTO: 1.64 10*3/MM3 (ref 0.7–3.1)
LYMPHOCYTES NFR BLD AUTO: 24.9 % (ref 19.6–45.3)
MCH RBC QN AUTO: 31.6 PG (ref 26.6–33)
MCHC RBC AUTO-ENTMCNC: 33.6 G/DL (ref 31.5–35.7)
MCV RBC AUTO: 94.3 FL (ref 79–97)
MONOCYTES # BLD AUTO: 0.37 10*3/MM3 (ref 0.1–0.9)
MONOCYTES NFR BLD AUTO: 5.6 % (ref 5–12)
NEUTROPHILS # BLD AUTO: 4.49 10*3/MM3 (ref 1.7–7)
NEUTROPHILS NFR BLD AUTO: 68.2 % (ref 42.7–76)
NRBC BLD AUTO-RTO: 0 /100 WBC (ref 0–0.2)
PLATELET # BLD AUTO: 223 10*3/MM3 (ref 140–450)
PMV BLD AUTO: 10.8 FL (ref 6–12)
POTASSIUM BLD-SCNC: 4.1 MMOL/L (ref 3.5–5.2)
PROT SERPL-MCNC: 7.4 G/DL (ref 6–8.5)
RBC # BLD AUTO: 4.71 10*6/MM3 (ref 3.77–5.28)
SODIUM BLD-SCNC: 139 MMOL/L (ref 136–145)
WBC NRBC COR # BLD: 6.58 10*3/MM3 (ref 3.4–10.8)

## 2020-05-11 PROCEDURE — 83690 ASSAY OF LIPASE: CPT

## 2020-05-11 PROCEDURE — 99214 OFFICE O/P EST MOD 30 MIN: CPT | Performed by: INTERNAL MEDICINE

## 2020-05-11 PROCEDURE — 82150 ASSAY OF AMYLASE: CPT

## 2020-05-11 PROCEDURE — 85025 COMPLETE CBC W/AUTO DIFF WBC: CPT

## 2020-05-11 PROCEDURE — 80053 COMPREHEN METABOLIC PANEL: CPT

## 2020-05-11 NOTE — PROGRESS NOTES
[facility]     Miriam Godoy  1960   [id number]      Patient Care Team:  Bishnu Riddle MD as PCP - General (Internal Medicine)    Chief Complaint::   Chief Complaint   Patient presents with   • abd discomfort     patient believes she has an ulcer; h/o ulcer.  Abd pain x 1 week. Taking TUMs.        HPI  Mrs. Godoy comes in complaining of intermittent abdominal pain for the past week.  She feels it stress related.  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  That all seems to be better but for the past few days she has had, usually in the evenings, episodes of sharp mid abdominal pain.  Last night she had a particularly severe episode that persisted despite taking Pepto-Bismol, Tums, ranitidine and a probiotic.  She has been eating and drinking normally.  However last night the pain persisted and when she was finally able to get to sleep she woke up early and had a episode of diffuse sweats.  This occurred with lesser abdominal pain.  She was able to eat oatmeal and peppermint tea which seemed to help this morning.  However even now in the office she has some slight sharp abdominal pain.  She had a normal bowel movement this morning.  [facility]  There is no fever, cough, shortness of breath, loss of taste or smell.    Chronic Conditions:      Patient Active Problem List   Diagnosis   • Premature ventricular contractions (PVCs) (VPCs)   • Rash   • Chronic allergic rhinitis   • Internal nasal lesion   • Hordeolum internum   • Annual physical exam        Past Medical History:   Diagnosis Date   • Aortic root dilatation (CMS/HCC)    • Atypical chest pain    • History of echocardiogram 04/16/2015    showing estimated EF 55% to 60% with mild aortic regurgitation and moderate dilatation of the aortic root   • History of EKG     normal   • History of palpitations    • Osteopenia   "      Past Surgical History:   Procedure Laterality Date   • APPENDECTOMY     • OOPHORECTOMY Bilateral        Family History   Problem Relation Age of Onset   • Breast cancer Mother 43   • Breast cancer Paternal Grandmother 47   • Coronary artery disease Father 57        premature   • Ovarian cancer Neg Hx        Social History     Socioeconomic History   • Marital status:      Spouse name: Not on file   • Number of children: Not on file   • Years of education: Not on file   • Highest education level: Not on file   Tobacco Use   • Smoking status: Former Smoker     Types: Cigarettes     Last attempt to quit:      Years since quittin.3   • Smokeless tobacco: Never Used   Substance and Sexual Activity   • Alcohol use: Yes     Comment: rare   • Drug use: No   • Sexual activity: Defer       Allergies   Allergen Reactions   • Penicillins Rash         Current Outpatient Medications:   •  BIOTIN PO, Take 1 tablet by mouth Daily., Disp: , Rfl:   •  CALCIUM-MAGNESIUM-ZINC PO, Take 1 tablet by mouth Daily., Disp: , Rfl:   •  Multiple Vitamins-Minerals (MULTIVITAMIN ADULT PO), Take 1 tablet by mouth Daily., Disp: , Rfl:   •  Omega-3 Fatty Acids (FISH OIL) 1000 MG capsule capsule, Take 1,000 mg by mouth Daily With Breakfast., Disp: , Rfl:   •  Cholecalciferol (VITAMIN D-3) 5000 units tablet, Take 1 tablet by mouth Daily., Disp: , Rfl:     Review of Systems   Constitutional: Negative for chills and fever.   Respiratory: Negative for cough, shortness of breath and wheezing.    Cardiovascular: Negative for chest pain.   Gastrointestinal: Positive for abdominal pain and constipation. Negative for nausea, vomiting, GERD and indigestion.        Vital Signs  Vitals:    20 1009   BP: 104/64   BP Location: Left arm   Patient Position: Sitting   Cuff Size: Adult   Pulse: 64   Temp: 97.7 °F (36.5 °C)   Weight: 52.6 kg (116 lb)   Height: 160 cm (62.99\")   PainSc:   6   PainLoc: Abdomen       Physical Exam "   Constitutional: She is oriented to person, place, and time. She appears well-developed and well-nourished.   HENT:   Head: Normocephalic and atraumatic.   Cardiovascular: Normal rate, regular rhythm and normal heart sounds.   No murmur heard.  Pulmonary/Chest: Effort normal and breath sounds normal.   Abdominal: Soft. Bowel sounds are normal. There is tenderness in the right upper quadrant and right lower quadrant. There is no rebound, no guarding and no CVA tenderness.   There is also mid epigastric pain.   Neurological: She is alert and oriented to person, place, and time.   Psychiatric: She has a normal mood and affect.   Vitals reviewed.     Procedures    ACE III MINI             Assessment/Plan:    Miriam was seen today for abd discomfort.    Diagnoses and all orders for this visit:    Generalized abdominal pain  -     CBC & Differential; Future  -     Comprehensive Metabolic Panel; Future  -     Lipase; Future  -     Amylase; Future    Plan    She has a benign abdomen with somewhat diffuse right sided abdominal pain.  Her impression was that she has an ulcer secondary to stress.  This seems more likely to be pancreatitis or gallbladder disease.  If labs are unhelpful, will consider imaging.      Plan of care reviewed with patient at the conclusion of today's visit. Education was provided regarding diagnosis, management, and any prescribed or recommended OTC medications.Patient verbalizes understanding of and agreement with management plan.         Bishnu Riddle MD

## 2020-05-12 ENCOUNTER — TELEPHONE (OUTPATIENT)
Dept: INTERNAL MEDICINE | Facility: CLINIC | Age: 60
End: 2020-05-12

## 2020-05-12 DIAGNOSIS — R10.31 RIGHT LOWER QUADRANT ABDOMINAL PAIN: ICD-10-CM

## 2020-05-12 DIAGNOSIS — R10.11 RIGHT UPPER QUADRANT ABDOMINAL PAIN: Primary | ICD-10-CM

## 2020-05-12 NOTE — TELEPHONE ENCOUNTER
----- Message from Bishnu Riddle MD sent at 5/12/2020  8:12 AM EDT -----  Please let her know that her labs are all normal, including blood counts, liver function and pancreas tests.  I am scheduling an ultrasound.

## 2020-05-19 ENCOUNTER — HOSPITAL ENCOUNTER (OUTPATIENT)
Dept: ULTRASOUND IMAGING | Facility: HOSPITAL | Age: 60
Discharge: HOME | End: 2020-05-19
Admitting: INTERNAL MEDICINE

## 2020-05-19 DIAGNOSIS — R10.11 RIGHT UPPER QUADRANT ABDOMINAL PAIN: ICD-10-CM

## 2020-05-19 DIAGNOSIS — R10.31 RIGHT LOWER QUADRANT ABDOMINAL PAIN: ICD-10-CM

## 2020-05-19 PROCEDURE — 76700 US EXAM ABDOM COMPLETE: CPT

## 2020-05-21 ENCOUNTER — TELEPHONE (OUTPATIENT)
Dept: INTERNAL MEDICINE | Facility: CLINIC | Age: 60
End: 2020-05-21

## 2020-05-21 NOTE — TELEPHONE ENCOUNTER
----- Message from Bishnu Riddle MD sent at 5/21/2020  8:07 AM EDT -----  Please let her know that her ultrasound was normal.  How is she feeling?

## 2020-05-21 NOTE — TELEPHONE ENCOUNTER
Patient states she is doing good and she verbalized understanding about ultrasound results. I am forwarding you another message on this patient that she just called in about.

## 2020-05-21 NOTE — TELEPHONE ENCOUNTER
PATIENT IS REQUESTING A CALL BACK ABOUT HER ULTRASOUND TEST RESULTS ON Tuesday      PATIENT STATES SHE HAD AN OBSTRUCTED BOWEL ABOUT 8  YEARS AGO AND SHE WANTED TO LET DR TREADWELL KNOW ABOUT THIS     PATIENT CALL BACK  [phone]

## 2020-07-20 ENCOUNTER — TRANSCRIBE ORDERS (OUTPATIENT)
Dept: ADMINISTRATIVE | Facility: HOSPITAL | Age: 60
End: 2020-07-20

## 2020-07-20 ENCOUNTER — TELEPHONE (OUTPATIENT)
Dept: INTERNAL MEDICINE | Facility: CLINIC | Age: 60
End: 2020-07-20

## 2020-07-20 DIAGNOSIS — Z78.0 MENOPAUSE: Primary | ICD-10-CM

## 2020-07-20 DIAGNOSIS — Z12.31 VISIT FOR SCREENING MAMMOGRAM: Primary | ICD-10-CM

## 2020-08-22 PROCEDURE — U0003 INFECTIOUS AGENT DETECTION BY NUCLEIC ACID (DNA OR RNA); SEVERE ACUTE RESPIRATORY SYNDROME CORONAVIRUS 2 (SARS-COV-2) (CORONAVIRUS DISEASE [COVID-19]), AMPLIFIED PROBE TECHNIQUE, MAKING USE OF HIGH THROUGHPUT TECHNOLOGIES AS DESCRIBED BY CMS-2020-01-R: HCPCS | Performed by: NURSE PRACTITIONER

## 2020-08-25 ENCOUNTER — TELEPHONE (OUTPATIENT)
Dept: URGENT CARE | Facility: CLINIC | Age: 60
End: 2020-08-25

## 2020-09-28 ENCOUNTER — OFFICE VISIT (OUTPATIENT)
Dept: INTERNAL MEDICINE | Facility: CLINIC | Age: 60
End: 2020-09-28

## 2020-09-28 VITALS
DIASTOLIC BLOOD PRESSURE: 62 MMHG | HEIGHT: 63 IN | WEIGHT: 115.6 LBS | HEART RATE: 60 BPM | BODY MASS INDEX: 20.48 KG/M2 | TEMPERATURE: 97.7 F | SYSTOLIC BLOOD PRESSURE: 100 MMHG

## 2020-09-28 DIAGNOSIS — Z13.29 SCREENING FOR ENDOCRINE, METABOLIC AND IMMUNITY DISORDER: ICD-10-CM

## 2020-09-28 DIAGNOSIS — Z13.228 SCREENING FOR ENDOCRINE, METABOLIC AND IMMUNITY DISORDER: ICD-10-CM

## 2020-09-28 DIAGNOSIS — J30.9 CHRONIC ALLERGIC RHINITIS: ICD-10-CM

## 2020-09-28 DIAGNOSIS — Z00.00 ANNUAL PHYSICAL EXAM: Primary | ICD-10-CM

## 2020-09-28 DIAGNOSIS — Z13.0 SCREENING FOR ENDOCRINE, METABOLIC AND IMMUNITY DISORDER: ICD-10-CM

## 2020-09-28 DIAGNOSIS — R53.83 OTHER FATIGUE: ICD-10-CM

## 2020-09-28 DIAGNOSIS — Z13.220 LIPID SCREENING: ICD-10-CM

## 2020-09-28 PROCEDURE — 90471 IMMUNIZATION ADMIN: CPT | Performed by: INTERNAL MEDICINE

## 2020-09-28 PROCEDURE — 90686 IIV4 VACC NO PRSV 0.5 ML IM: CPT | Performed by: INTERNAL MEDICINE

## 2020-09-28 PROCEDURE — 99396 PREV VISIT EST AGE 40-64: CPT | Performed by: INTERNAL MEDICINE

## 2020-09-30 ENCOUNTER — LAB (OUTPATIENT)
Dept: LAB | Facility: HOSPITAL | Age: 60
End: 2020-09-30

## 2020-09-30 DIAGNOSIS — Z13.228 SCREENING FOR ENDOCRINE, METABOLIC AND IMMUNITY DISORDER: ICD-10-CM

## 2020-09-30 DIAGNOSIS — R53.83 OTHER FATIGUE: ICD-10-CM

## 2020-09-30 DIAGNOSIS — Z13.0 SCREENING FOR ENDOCRINE, METABOLIC AND IMMUNITY DISORDER: ICD-10-CM

## 2020-09-30 DIAGNOSIS — Z13.29 SCREENING FOR ENDOCRINE, METABOLIC AND IMMUNITY DISORDER: ICD-10-CM

## 2020-09-30 DIAGNOSIS — Z13.220 LIPID SCREENING: ICD-10-CM

## 2020-09-30 LAB
ALBUMIN SERPL-MCNC: 4.3 G/DL (ref 3.5–5.2)
ALBUMIN/GLOB SERPL: 1.4 G/DL
ALP SERPL-CCNC: 105 U/L (ref 39–117)
ALT SERPL W P-5'-P-CCNC: 17 U/L (ref 1–33)
ANION GAP SERPL CALCULATED.3IONS-SCNC: 13 MMOL/L (ref 5–15)
AST SERPL-CCNC: 23 U/L (ref 1–32)
BASOPHILS # BLD AUTO: 0.05 10*3/MM3 (ref 0–0.2)
BASOPHILS NFR BLD AUTO: 0.9 % (ref 0–1.5)
BILIRUB SERPL-MCNC: 0.4 MG/DL (ref 0–1.2)
BUN SERPL-MCNC: 16 MG/DL (ref 8–23)
BUN/CREAT SERPL: 18.8 (ref 7–25)
CALCIUM SPEC-SCNC: 9.9 MG/DL (ref 8.6–10.5)
CHLORIDE SERPL-SCNC: 98 MMOL/L (ref 98–107)
CHOLEST SERPL-MCNC: 164 MG/DL (ref 0–200)
CO2 SERPL-SCNC: 28 MMOL/L (ref 22–29)
CREAT SERPL-MCNC: 0.85 MG/DL (ref 0.57–1)
DEPRECATED RDW RBC AUTO: 45.3 FL (ref 37–54)
EOSINOPHIL # BLD AUTO: 0.12 10*3/MM3 (ref 0–0.4)
EOSINOPHIL NFR BLD AUTO: 2.1 % (ref 0.3–6.2)
ERYTHROCYTE [DISTWIDTH] IN BLOOD BY AUTOMATED COUNT: 13.1 % (ref 12.3–15.4)
GFR SERPL CREATININE-BSD FRML MDRD: 68 ML/MIN/1.73
GLOBULIN UR ELPH-MCNC: 3.1 GM/DL
GLUCOSE SERPL-MCNC: 78 MG/DL (ref 65–99)
HCT VFR BLD AUTO: 41.9 % (ref 34–46.6)
HDLC SERPL-MCNC: 57 MG/DL (ref 40–60)
HGB BLD-MCNC: 13.9 G/DL (ref 12–15.9)
IMM GRANULOCYTES # BLD AUTO: 0.01 10*3/MM3 (ref 0–0.05)
IMM GRANULOCYTES NFR BLD AUTO: 0.2 % (ref 0–0.5)
LDLC SERPL CALC-MCNC: 92 MG/DL (ref 0–100)
LDLC/HDLC SERPL: 1.61 {RATIO}
LYMPHOCYTES # BLD AUTO: 2.41 10*3/MM3 (ref 0.7–3.1)
LYMPHOCYTES NFR BLD AUTO: 41.9 % (ref 19.6–45.3)
MCH RBC QN AUTO: 31 PG (ref 26.6–33)
MCHC RBC AUTO-ENTMCNC: 33.2 G/DL (ref 31.5–35.7)
MCV RBC AUTO: 93.3 FL (ref 79–97)
MONOCYTES # BLD AUTO: 0.43 10*3/MM3 (ref 0.1–0.9)
MONOCYTES NFR BLD AUTO: 7.5 % (ref 5–12)
NEUTROPHILS NFR BLD AUTO: 2.73 10*3/MM3 (ref 1.7–7)
NEUTROPHILS NFR BLD AUTO: 47.4 % (ref 42.7–76)
NRBC BLD AUTO-RTO: 0 /100 WBC (ref 0–0.2)
PLATELET # BLD AUTO: 263 10*3/MM3 (ref 140–450)
PMV BLD AUTO: 10.3 FL (ref 6–12)
POTASSIUM SERPL-SCNC: 4 MMOL/L (ref 3.5–5.2)
PROT SERPL-MCNC: 7.4 G/DL (ref 6–8.5)
RBC # BLD AUTO: 4.49 10*6/MM3 (ref 3.77–5.28)
SODIUM SERPL-SCNC: 139 MMOL/L (ref 136–145)
TRIGL SERPL-MCNC: 77 MG/DL (ref 0–150)
TSH SERPL DL<=0.05 MIU/L-ACNC: 2.39 UIU/ML (ref 0.27–4.2)
VIT B12 BLD-MCNC: 1597 PG/ML (ref 211–946)
VLDLC SERPL-MCNC: 15.4 MG/DL (ref 5–40)
WBC # BLD AUTO: 5.75 10*3/MM3 (ref 3.4–10.8)

## 2020-09-30 PROCEDURE — 82607 VITAMIN B-12: CPT

## 2020-09-30 PROCEDURE — 80061 LIPID PANEL: CPT

## 2020-09-30 PROCEDURE — 84443 ASSAY THYROID STIM HORMONE: CPT

## 2020-09-30 PROCEDURE — 80053 COMPREHEN METABOLIC PANEL: CPT

## 2020-09-30 PROCEDURE — 85025 COMPLETE CBC W/AUTO DIFF WBC: CPT

## 2020-11-05 ENCOUNTER — HOSPITAL ENCOUNTER (OUTPATIENT)
Dept: MAMMOGRAPHY | Facility: HOSPITAL | Age: 60
Discharge: HOME | End: 2020-11-05
Admitting: ADVANCED PRACTICE MIDWIFE

## 2020-11-05 DIAGNOSIS — Z12.31 VISIT FOR SCREENING MAMMOGRAM: ICD-10-CM

## 2020-11-05 PROCEDURE — 77063 BREAST TOMOSYNTHESIS BI: CPT

## 2020-11-05 PROCEDURE — 77063 BREAST TOMOSYNTHESIS BI: CPT | Performed by: RADIOLOGY

## 2020-11-05 PROCEDURE — 77067 SCR MAMMO BI INCL CAD: CPT | Performed by: RADIOLOGY

## 2020-11-05 PROCEDURE — 77067 SCR MAMMO BI INCL CAD: CPT

## 2020-12-14 ENCOUNTER — HOSPITAL ENCOUNTER (OUTPATIENT)
Dept: BONE DENSITY | Facility: HOSPITAL | Age: 60
Discharge: HOME | End: 2020-12-14
Admitting: INTERNAL MEDICINE

## 2020-12-14 DIAGNOSIS — Z78.0 MENOPAUSE: ICD-10-CM

## 2020-12-14 PROCEDURE — 77080 DXA BONE DENSITY AXIAL: CPT

## 2021-01-27 ENCOUNTER — TELEPHONE (OUTPATIENT)
Dept: INTERNAL MEDICINE | Facility: CLINIC | Age: 61
End: 2021-01-27

## 2021-01-27 NOTE — TELEPHONE ENCOUNTER
"LVM for patient to return call.     This was sent to patient via portal by KAMILA on 01/03/21.    \"Miriam,  You have normal bones in your hip, but in your spine you have \"osteopenia.\"  Osteopenia is the gray zone between normal bone and osteoporosis.  The treatment is regular weight bearing exercise such as walking, a total of 1200 mg calcium, including the calcium in your diet (you should get as much of your daily calcium as you can in food), and 1000 units of vitamin D daily.  We should repeat this when you are 63.\"    "

## 2021-01-27 NOTE — TELEPHONE ENCOUNTER
PT CALLED REQUESTING A CALL BACK REGARDING HER BONE DENSITY TEST RESULTS    PLEASE ADVISE AT: [phone]

## 2021-04-13 ENCOUNTER — TELEPHONE (OUTPATIENT)
Dept: INTERNAL MEDICINE | Facility: CLINIC | Age: 61
End: 2021-04-13

## 2021-04-13 DIAGNOSIS — M85.88 OSTEOPENIA OF LUMBAR SPINE: Primary | ICD-10-CM

## 2021-04-13 NOTE — TELEPHONE ENCOUNTER
Caller: Walt Miriam Singleton    Relationship: Self    Best call back number: [phone]    What orders are you requesting (i.e. lab or imaging): ENDOCRINOLOGIST (REFERRAL)    In what timeframe would the patient need to come in: ASAP    Where will you receive your lab/imaging services: WITHIN [facility]    Additional notes: PATIENT IS REACHING OUT ABOUT GETTING A REFERRAL TO AN ENDOCRINOLOGIST. SHE STATES THAT SHE HAS OSTEOPENIA AND HAS HEARD THAT THERE ARE INJECTIONS THAT COULD HELP WITH THE BONE DENSITY. THE PATIENT WANTED TO SEE WHAT HER PCP'S THOUGHTS WERE ON THIS. PATIENT IS ASKING TO BE CONTACTED BACK.

## 2021-04-13 NOTE — TELEPHONE ENCOUNTER
Typically the treatment for osteopenia is calcium, vitamin D, and regular exercise.  There are several medications, both oral and injectable, used for osteoporosis.  If she would like to discuss this with a specialist I have no problem with that.

## 2021-04-20 NOTE — TELEPHONE ENCOUNTER
PATIENT STATES SHE IS UNABLE TO BE SEEN BY ENDOCRINOLOGY UNTIL AUGUST AND WOULD LIKE A NEW REFERRAL PLACED ELSEWHERE.       CALL BACK [phone]

## 2021-04-21 NOTE — TELEPHONE ENCOUNTER
Please see phone note.  PT not happy with august appt.  I tried to cancel and refer pt to Dr Davies at [facility], but would not let me because already scheduled.

## 2021-04-22 ENCOUNTER — TELEPHONE (OUTPATIENT)
Dept: INTERNAL MEDICINE | Facility: CLINIC | Age: 61
End: 2021-04-22

## 2021-04-22 DIAGNOSIS — M21.619 BUNION: Primary | ICD-10-CM

## 2021-04-22 NOTE — TELEPHONE ENCOUNTER
Caller: Miriam oGdoy    Relationship: Self    Best call back number: [phone]    What is the medical concern/diagnosis: RIGHT FOOT BUNION    What specialty or service is being requested: PODIATRIST    Any additional details: PATIENT STATED THAT SHE HAS A BUNION ON HER RIGHT FOOT AND WOULD LIKE A REFERRAL TO SEE A PODIATRIST TO HAVE THIS TAKEN CARE OF

## 2021-06-16 ENCOUNTER — TELEPHONE (OUTPATIENT)
Dept: INTERNAL MEDICINE | Facility: CLINIC | Age: 61
End: 2021-06-16

## 2021-06-16 DIAGNOSIS — N63.0 BREAST LUMP: Primary | ICD-10-CM

## 2021-06-16 NOTE — TELEPHONE ENCOUNTER
Caller: Miriam Godoy    Relationship: Self    Best call back number: [phone]     What is the medical concern/diagnosis: LUMP IN LEFT BREAST  What specialty or service is being requested: ULTRASOUND    What is the provider, practice or medical service name: [facility]    What is the office location:  [facility] at [address]   What is the office phone number: UNSURE  Any additional details: THE PATIENT IS REQUESTING TO HAVE THIS DONE VERY SOON BECAUSE SHE IS LEAVING FOR A 6 WEEK TRIP ON 06/24/2021.    PLEASE CALL AND ADVISE -[phone].

## 2021-06-16 NOTE — TELEPHONE ENCOUNTER
Need more information.  Is this a new lump that she found, or did she have a recent mammogram that showed a lump.  IT looks like her last mammogram was last November. If this is a new lump that she recently found, what is typically recommended is a diagnostic mammogram.

## 2021-08-16 ENCOUNTER — HOSPITAL ENCOUNTER (OUTPATIENT)
Dept: MAMMOGRAPHY | Facility: HOSPITAL | Age: 61
Discharge: HOME | End: 2021-08-16

## 2021-08-16 ENCOUNTER — HOSPITAL ENCOUNTER (OUTPATIENT)
Dept: ULTRASOUND IMAGING | Facility: HOSPITAL | Age: 61
Discharge: HOME | End: 2021-08-16

## 2021-08-16 DIAGNOSIS — N63.0 BREAST LUMP: ICD-10-CM

## 2021-08-16 PROCEDURE — 76642 ULTRASOUND BREAST LIMITED: CPT | Performed by: RADIOLOGY

## 2021-08-16 PROCEDURE — 77065 DX MAMMO INCL CAD UNI: CPT

## 2021-08-16 PROCEDURE — 77061 BREAST TOMOSYNTHESIS UNI: CPT | Performed by: RADIOLOGY

## 2021-08-16 PROCEDURE — G0279 TOMOSYNTHESIS, MAMMO: HCPCS

## 2021-08-16 PROCEDURE — 77065 DX MAMMO INCL CAD UNI: CPT | Performed by: RADIOLOGY

## 2021-08-16 PROCEDURE — 76642 ULTRASOUND BREAST LIMITED: CPT

## 2021-09-08 ENCOUNTER — OFFICE VISIT (OUTPATIENT)
Dept: INTERNAL MEDICINE | Facility: CLINIC | Age: 61
End: 2021-09-08

## 2021-09-08 VITALS
SYSTOLIC BLOOD PRESSURE: 92 MMHG | HEIGHT: 63 IN | DIASTOLIC BLOOD PRESSURE: 60 MMHG | HEART RATE: 68 BPM | TEMPERATURE: 98.4 F | WEIGHT: 117.4 LBS | BODY MASS INDEX: 20.8 KG/M2

## 2021-09-08 DIAGNOSIS — J01.00 ACUTE NON-RECURRENT MAXILLARY SINUSITIS: Primary | ICD-10-CM

## 2021-09-08 PROCEDURE — 99213 OFFICE O/P EST LOW 20 MIN: CPT | Performed by: INTERNAL MEDICINE

## 2021-09-08 RX ORDER — SULFAMETHOXAZOLE AND TRIMETHOPRIM 800; 160 MG/1; MG/1
1 TABLET ORAL 2 TIMES DAILY
Qty: 14 TABLET | Refills: 0 | Status: SHIPPED | OUTPATIENT
Start: 2021-09-08 | End: 2021-10-22

## 2021-09-08 NOTE — PROGRESS NOTES
[facility]     Miriam oGdoy  1960   [id number]      Patient Care Team:  Bishnu Riddle MD as PCP - General (Internal Medicine)    Chief Complaint::   Chief Complaint   Patient presents with   • Earache     left   • Fatigue     lack of energy   • loss of appetite   • Sinusitis        HPI  Ms. Godoy comes in complaining of right ear discomfort, facial pain, fatigue and loss of appetite.  She has had no fever or chills.  She states this is similar to her previous sinus infections.    Chronic Conditions:      Patient Active Problem List   Diagnosis   • Premature ventricular contractions (PVCs) (VPCs)   • Rash   • Chronic allergic rhinitis   • Internal nasal lesion   • Hordeolum internum   • Annual physical exam        Past Medical History:   Diagnosis Date   • Aortic root dilatation (CMS/HCC)    • Atypical chest pain    • History of echocardiogram 2015    showing estimated EF 55% to 60% with mild aortic regurgitation and moderate dilatation of the aortic root   • History of EKG     normal   • History of palpitations    • Osteopenia        Past Surgical History:   Procedure Laterality Date   • APPENDECTOMY     • OOPHORECTOMY Bilateral        Family History   Problem Relation Age of Onset   • Breast cancer Mother 43   • Breast cancer Paternal Grandmother 47   • Coronary artery disease Father 57        premature   • Ovarian cancer Neg Hx        Social History     Socioeconomic History   • Marital status:      Spouse name: Not on file   • Number of children: Not on file   • Years of education: Not on file   • Highest education level: Not on file   Tobacco Use   • Smoking status: Former Smoker     Packs/day: 0.50     Years: 9.00     Pack years: 4.50     Types: Cigarettes     Quit date:      Years since quittin.7   • Smokeless tobacco: Never Used   Substance and Sexual Activity   • Alcohol use: Yes     Comment: rare   • Drug use: No   • Sexual activity: Defer  "      Allergies   Allergen Reactions   • Penicillins Rash         Current Outpatient Medications:   •  BIOTIN PO, Take 1 tablet by mouth Daily., Disp: , Rfl:   •  CALCIUM-MAGNESIUM-ZINC PO, Take 1 tablet by mouth Daily., Disp: , Rfl:   •  Cholecalciferol (VITAMIN D-3) 5000 units tablet, Take 1 tablet by mouth Daily., Disp: , Rfl:   •  Multiple Vitamins-Minerals (MULTIVITAMIN ADULT PO), Take 1 tablet by mouth Daily., Disp: , Rfl:   •  Multiple Vitamins-Minerals (PRESERVISION AREDS PO), Take 1 tablet by mouth Daily., Disp: , Rfl:   •  Omega-3 Fatty Acids (FISH OIL) 1000 MG capsule capsule, Take 1,000 mg by mouth Daily With Breakfast., Disp: , Rfl:   •  Probiotic Product (PROBIOTIC DAILY PO), Take 1 tablet by mouth Daily., Disp: , Rfl:   •  sulfamethoxazole-trimethoprim (BACTRIM DS,SEPTRA DS) 800-160 MG per tablet, Take 1 tablet by mouth 2 (Two) Times a Day., Disp: 14 tablet, Rfl: 0    Review of Systems   Constitutional: Negative.  Negative for fever.   HENT: Positive for ear pain and sinus pressure.    Respiratory: Negative.  Negative for cough, chest tightness and shortness of breath.    Cardiovascular: Negative.  Negative for chest pain.   Gastrointestinal: Negative for abdominal pain, blood in stool, constipation and diarrhea.        Vital Signs  Vitals:    09/08/21 1555   BP: 92/60   BP Location: Left arm   Patient Position: Sitting   Cuff Size: Adult   Pulse: 68   Temp: 98.4 °F (36.9 °C)   Weight: 53.3 kg (117 lb 6.4 oz)   Height: 160 cm (62.99\")   PainSc: 0-No pain       Physical Exam  Vitals reviewed.   Constitutional:       Appearance: She is well-developed.   HENT:      Head: Normocephalic and atraumatic.      Right Ear: A middle ear effusion is present. Tympanic membrane is bulging.      Left Ear: Tympanic membrane normal.      Nose:      Right Sinus: Maxillary sinus tenderness present. No frontal sinus tenderness.      Left Sinus: No maxillary sinus tenderness or frontal sinus tenderness.   Cardiovascular: "      Rate and Rhythm: Normal rate and regular rhythm.      Heart sounds: Normal heart sounds. No murmur heard.     Pulmonary:      Effort: Pulmonary effort is normal.      Breath sounds: Normal breath sounds.   Neurological:      Mental Status: She is alert and oriented to person, place, and time.          Procedures    ACE III MINI             Assessment/Plan:    Diagnoses and all orders for this visit:    1. Acute non-recurrent maxillary sinusitis (Primary)    Other orders  -     sulfamethoxazole-trimethoprim (BACTRIM DS,SEPTRA DS) 800-160 MG per tablet; Take 1 tablet by mouth 2 (Two) Times a Day.  Dispense: 14 tablet; Refill: 0          Plan of care reviewed with patient at the conclusion of today's visit. Education was provided regarding diagnosis, management, and any prescribed or recommended OTC medications.Patient verbalizes understanding of and agreement with management plan.         Bishnu Riddle MD

## 2021-10-22 ENCOUNTER — OFFICE VISIT (OUTPATIENT)
Dept: INTERNAL MEDICINE | Facility: CLINIC | Age: 61
End: 2021-10-22

## 2021-10-22 ENCOUNTER — HOSPITAL ENCOUNTER (OUTPATIENT)
Dept: GENERAL RADIOLOGY | Facility: HOSPITAL | Age: 61
Discharge: HOME | End: 2021-10-22
Admitting: PHYSICIAN ASSISTANT

## 2021-10-22 VITALS
DIASTOLIC BLOOD PRESSURE: 62 MMHG | WEIGHT: 119.2 LBS | TEMPERATURE: 98.4 F | HEART RATE: 59 BPM | HEIGHT: 63 IN | BODY MASS INDEX: 21.12 KG/M2 | SYSTOLIC BLOOD PRESSURE: 98 MMHG

## 2021-10-22 DIAGNOSIS — M54.50 ACUTE LEFT-SIDED LOW BACK PAIN WITHOUT SCIATICA: Primary | ICD-10-CM

## 2021-10-22 DIAGNOSIS — M54.50 ACUTE LEFT-SIDED LOW BACK PAIN WITHOUT SCIATICA: ICD-10-CM

## 2021-10-22 PROCEDURE — 72100 X-RAY EXAM L-S SPINE 2/3 VWS: CPT

## 2021-10-22 PROCEDURE — 99213 OFFICE O/P EST LOW 20 MIN: CPT | Performed by: PHYSICIAN ASSISTANT

## 2021-10-22 PROCEDURE — 72072 X-RAY EXAM THORAC SPINE 3VWS: CPT

## 2021-10-22 NOTE — PATIENT INSTRUCTIONS
Low Back Sprain or Strain Rehab  Ask your health care provider which exercises are safe for you. Do exercises exactly as told by your health care provider and adjust them as directed. It is normal to feel mild stretching, pulling, tightness, or discomfort as you do these exercises. Stop right away if you feel sudden pain or your pain gets worse. Do not begin these exercises until told by your health care provider.  Stretching and range-of-motion exercises  These exercises warm up your muscles and joints and improve the movement and flexibility of your back. These exercises also help to relieve pain, numbness, and tingling.  Lumbar rotation    1. Lie on your back on a firm surface and bend your knees.  2. Straighten your arms out to your sides so each arm forms a 90-degree angle (right angle) with a side of your body.  3. Slowly move (rotate) both of your knees to one side of your body until you feel a stretch in your lower back (lumbar). Try not to let your shoulders lift off the floor.  4. Hold this position for __________ seconds.  5. Tense your abdominal muscles and slowly move your knees back to the starting position.  6. Repeat this exercise on the other side of your body.  Repeat __________ times. Complete this exercise __________ times a day.  Single knee to chest    1. Lie on your back on a firm surface with both legs straight.  2. Bend one of your knees. Use your hands to move your knee up toward your chest until you feel a gentle stretch in your lower back and buttock.  ? Hold your leg in this position by holding on to the front of your knee.  ? Keep your other leg as straight as possible.  3. Hold this position for __________ seconds.  4. Slowly return to the starting position.  5. Repeat with your other leg.  Repeat __________ times. Complete this exercise __________ times a day.  Prone extension on elbows    1. Lie on your abdomen on a firm surface (prone position).  2. Prop yourself up on your  elbows.  3. Use your arms to help lift your chest up until you feel a gentle stretch in your abdomen and your lower back.  ? This will place some of your body weight on your elbows. If this is uncomfortable, try stacking pillows under your chest.  ? Your hips should stay down, against the surface that you are lying on. Keep your hip and back muscles relaxed.  4. Hold this position for __________ seconds.  5. Slowly relax your upper body and return to the starting position.  Repeat __________ times. Complete this exercise __________ times a day.  Strengthening exercises  These exercises build strength and endurance in your back. Endurance is the ability to use your muscles for a long time, even after they get tired.  Pelvic tilt  This exercise strengthens the muscles that lie deep in the abdomen.  1. Lie on your back on a firm surface. Bend your knees and keep your feet flat on the floor.  2. Tense your abdominal muscles. Tip your pelvis up toward the ceiling and flatten your lower back into the floor.  ? To help with this exercise, you may place a small towel under your lower back and try to push your back into the towel.  3. Hold this position for __________ seconds.  4. Let your muscles relax completely before you repeat this exercise.  Repeat __________ times. Complete this exercise __________ times a day.  Alternating arm and leg raises    1. Get on your hands and knees on a firm surface. If you are on a hard floor, you may want to use padding, such as an exercise mat, to cushion your knees.  2. Line up your arms and legs. Your hands should be directly below your shoulders, and your knees should be directly below your hips.  3. Lift your left leg behind you. At the same time, raise your right arm and straighten it in front of you.  ? Do not lift your leg higher than your hip.  ? Do not lift your arm higher than your shoulder.  ? Keep your abdominal and back muscles tight.  ? Keep your hips facing the  ground.  ? Do not arch your back.  ? Keep your balance carefully, and do not hold your breath.  4. Hold this position for __________ seconds.  5. Slowly return to the starting position.  6. Repeat with your right leg and your left arm.  Repeat __________ times. Complete this exercise __________ times a day.  Abdominal set with straight leg raise    1. Lie on your back on a firm surface.  2. Bend one of your knees and keep your other leg straight.  3. Tense your abdominal muscles and lift your straight leg up, 4-6 inches (10-15 cm) off the ground.  4. Keep your abdominal muscles tight and hold this position for __________ seconds.  ? Do not hold your breath.  ? Do not arch your back. Keep it flat against the ground.  5. Keep your abdominal muscles tense as you slowly lower your leg back to the starting position.  6. Repeat with your other leg.  Repeat __________ times. Complete this exercise __________ times a day.  Single leg lower with bent knees  1. Lie on your back on a firm surface.  2. Tense your abdominal muscles and lift your feet off the floor, one foot at a time, so your knees and hips are bent in 90-degree angles (right angles).  ? Your knees should be over your hips and your lower legs should be parallel to the floor.  3. Keeping your abdominal muscles tense and your knee bent, slowly lower one of your legs so your toe touches the ground.  4. Lift your leg back up to return to the starting position.  ? Do not hold your breath.  ? Do not let your back arch. Keep your back flat against the ground.  5. Repeat with your other leg.  Repeat __________ times. Complete this exercise __________ times a day.  Posture and body mechanics  Good posture and healthy body mechanics can help to relieve stress in your body's tissues and joints. Body mechanics refers to the movements and positions of your body while you do your daily activities. Posture is part of body mechanics. Good posture means:  · Your spine is in its  natural S-curve position (neutral).  · Your shoulders are pulled back slightly.  · Your head is not tipped forward.  Follow these guidelines to improve your posture and body mechanics in your everyday activities.  Standing    · When standing, keep your spine neutral and your feet about hip width apart. Keep a slight bend in your knees. Your ears, shoulders, and hips should line up.  · When you do a task in which you  one place for a long time, place one foot up on a stable object that is 2-4 inches (5-10 cm) high, such as a footstool. This helps keep your spine neutral.    Sitting    · When sitting, keep your spine neutral and keep your feet flat on the floor. Use a footrest, if necessary, and keep your thighs parallel to the floor. Avoid rounding your shoulders, and avoid tilting your head forward.  · When working at a desk or a computer, keep your desk at a height where your hands are slightly lower than your elbows. Slide your chair under your desk so you are close enough to maintain good posture.  · When working at a computer, place your monitor at a height where you are looking straight ahead and you do not have to tilt your head forward or downward to look at the screen.    Resting  · When lying down and resting, avoid positions that are most painful for you.  · If you have pain with activities such as sitting, bending, stooping, or squatting, lie in a position in which your body does not bend very much. For example, avoid curling up on your side with your arms and knees near your chest (fetal position).  · If you have pain with activities such as standing for a long time or reaching with your arms, lie with your spine in a neutral position and bend your knees slightly. Try the following positions:  ? Lying on your side with a pillow between your knees.  ? Lying on your back with a pillow under your knees.  Lifting    · When lifting objects, keep your feet at least shoulder width apart and tighten your  abdominal muscles.  · Bend your knees and hips and keep your spine neutral. It is important to lift using the strength of your legs, not your back. Do not lock your knees straight out.  · Always ask for help to lift heavy or awkward objects.    This information is not intended to replace advice given to you by your health care provider. Make sure you discuss any questions you have with your health care provider.  Document Revised: 04/10/2020 Document Reviewed: 01/09/2020  [employer]vier Patient Education © 2021 [employer]

## 2021-10-22 NOTE — PROGRESS NOTES
Patient Care Team:  Bishnu Riddle MD as PCP - General (Internal Medicine)    Chief Complaint::   Chief Complaint   Patient presents with   • Back Pain     spinal         Subjective     HPI  Miriam is a 60 year old female who presents with back pain.  She is a , performs yoga daily.  She was doing stretches in the shower several weeks ago, when she felt and heard a loud snap along her vertebrae.  She is only having pain with left trunk rotation and with extreme forward flexion.  Has seen massage therapist in the past for flareups.  She denies numbness and paresthesias.  She denies weakness in extremities.        The following portions of the patient's history were reviewed and updated as appropriate: active problem list, medication list, allergies, family history, social history    Review of Systems:   Review of Systems   Constitutional: Negative for activity change, appetite change, diaphoresis, fatigue, unexpected weight gain and unexpected weight loss.   HENT: Negative for hearing loss.    Eyes: Negative for visual disturbance.   Respiratory: Negative for chest tightness and shortness of breath.    Cardiovascular: Negative for chest pain, palpitations and leg swelling.   Gastrointestinal: Negative for abdominal pain, blood in stool, GERD and indigestion.   Endocrine: Negative for cold intolerance and heat intolerance.   Genitourinary: Negative for dysuria and hematuria.   Musculoskeletal: Positive for back pain. Negative for arthralgias and myalgias.   Skin: Negative for skin lesions.   Neurological: Negative for tremors, seizures, syncope, speech difficulty, weakness, headache, memory problem and confusion.   Hematological: Does not bruise/bleed easily.   Psychiatric/Behavioral: Negative for sleep disturbance and depressed mood. The patient is not nervous/anxious.        Vital Signs  Vitals:    10/22/21 0949   BP: 98/62   BP Location: Left arm   Patient Position: Sitting   Cuff Size: Adult  "  Pulse: 59   Temp: 98.4 °F (36.9 °C)   TempSrc: Temporal   Weight: 54.1 kg (119 lb 3.2 oz)   Height: 160 cm (62.99\")   PainSc: 0-No pain  Comment: 4 when moving     Body mass index is 21.12 kg/m².    Labs  No visits with results within 3 Month(s) from this visit.   Latest known visit with results is:   Lab on 09/30/2020   Component Date Value Ref Range Status   • Glucose 09/30/2020 78  65 - 99 mg/dL Final   • BUN 09/30/2020 16  8 - 23 mg/dL Final   • Creatinine 09/30/2020 0.85  0.57 - 1.00 mg/dL Final   • Sodium 09/30/2020 139  136 - 145 mmol/L Final   • Potassium 09/30/2020 4.0  3.5 - 5.2 mmol/L Final   • Chloride 09/30/2020 98  98 - 107 mmol/L Final   • CO2 09/30/2020 28.0  22.0 - 29.0 mmol/L Final   • Calcium 09/30/2020 9.9  8.6 - 10.5 mg/dL Final   • Total Protein 09/30/2020 7.4  6.0 - 8.5 g/dL Final   • Albumin 09/30/2020 4.30  3.50 - 5.20 g/dL Final   • ALT (SGPT) 09/30/2020 17  1 - 33 U/L Final   • AST (SGOT) 09/30/2020 23  1 - 32 U/L Final   • Alkaline Phosphatase 09/30/2020 105  39 - 117 U/L Final   • Total Bilirubin 09/30/2020 0.4  0.0 - 1.2 mg/dL Final   • eGFR Non  Amer 09/30/2020 68  >60 mL/min/1.73 Final   • Globulin 09/30/2020 3.1  gm/dL Final   • A/G Ratio 09/30/2020 1.4  g/dL Final   • BUN/Creatinine Ratio 09/30/2020 18.8  7.0 - 25.0 Final   • Anion Gap 09/30/2020 13.0  5.0 - 15.0 mmol/L Final   • Total Cholesterol 09/30/2020 164  0 - 200 mg/dL Final   • Triglycerides 09/30/2020 77  0 - 150 mg/dL Final   • HDL Cholesterol 09/30/2020 57  40 - 60 mg/dL Final   • LDL Cholesterol  09/30/2020 92  0 - 100 mg/dL Final   • VLDL Cholesterol 09/30/2020 15.4  5 - 40 mg/dL Final   • LDL/HDL Ratio 09/30/2020 1.61   Final   • TSH 09/30/2020 2.390  0.270 - 4.200 uIU/mL Final   • Vitamin B-12 09/30/2020 1,597* 211 - 946 pg/mL Final   • WBC 09/30/2020 5.75  3.40 - 10.80 10*3/mm3 Final   • RBC 09/30/2020 4.49  3.77 - 5.28 10*6/mm3 Final   • Hemoglobin 09/30/2020 13.9  12.0 - 15.9 g/dL Final   • Hematocrit " 09/30/2020 41.9  34.0 - 46.6 % Final   • MCV 09/30/2020 93.3  79.0 - 97.0 fL Final   • MCH 09/30/2020 31.0  26.6 - 33.0 pg Final   • MCHC 09/30/2020 33.2  31.5 - 35.7 g/dL Final   • RDW 09/30/2020 13.1  12.3 - 15.4 % Final   • RDW-SD 09/30/2020 45.3  37.0 - 54.0 fl Final   • MPV 09/30/2020 10.3  6.0 - 12.0 fL Final   • Platelets 09/30/2020 263  140 - 450 10*3/mm3 Final   • Neutrophil % 09/30/2020 47.4  42.7 - 76.0 % Final   • Lymphocyte % 09/30/2020 41.9  19.6 - 45.3 % Final   • Monocyte % 09/30/2020 7.5  5.0 - 12.0 % Final   • Eosinophil % 09/30/2020 2.1  0.3 - 6.2 % Final   • Basophil % 09/30/2020 0.9  0.0 - 1.5 % Final   • Immature Grans % 09/30/2020 0.2  0.0 - 0.5 % Final   • Neutrophils, Absolute 09/30/2020 2.73  1.70 - 7.00 10*3/mm3 Final   • Lymphocytes, Absolute 09/30/2020 2.41  0.70 - 3.10 10*3/mm3 Final   • Monocytes, Absolute 09/30/2020 0.43  0.10 - 0.90 10*3/mm3 Final   • Eosinophils, Absolute 09/30/2020 0.12  0.00 - 0.40 10*3/mm3 Final   • Basophils, Absolute 09/30/2020 0.05  0.00 - 0.20 10*3/mm3 Final   • Immature Grans, Absolute 09/30/2020 0.01  0.00 - 0.05 10*3/mm3 Final   • nRBC 09/30/2020 0.0  0.0 - 0.2 /100 WBC Final       Imaging  XR Spine Thoracic 3 View    Result Date: 10/25/2021  No acute bony abnormality.  DICTATED:   10/22/2021 EDITED/lfs:   10/22/2021  This report was finalized on 10/25/2021 11:49 AM by Dr. Val Mcmahon MD.      XR Spine Lumbar 2 or 3 View    Result Date: 10/25/2021  Minimal degenerative changes identified within the lumbar spine with no evidence of fracture or malalignment.  DICTATED:   10/24/2021 EDITED/lfs:   10/24/2021  This report was finalized on 10/25/2021 11:49 AM by Dr. Val Mcmahon MD.          Current Outpatient Medications:   •  BIOTIN PO, Take 1 tablet by mouth Daily., Disp: , Rfl:   •  CALCIUM-MAGNESIUM-ZINC PO, Take 1 tablet by mouth Daily., Disp: , Rfl:   •  Cholecalciferol (VITAMIN D-3) 5000 units tablet, Take 1 tablet by mouth Daily., Disp: ,  Rfl:   •  Multiple Vitamins-Minerals (MULTIVITAMIN ADULT PO), Take 1 tablet by mouth Daily., Disp: , Rfl:   •  Multiple Vitamins-Minerals (PRESERVISION AREDS PO), Take 1 tablet by mouth Daily., Disp: , Rfl:   •  Omega-3 Fatty Acids (FISH OIL) 1000 MG capsule capsule, Take 1,000 mg by mouth Daily With Breakfast., Disp: , Rfl:   •  Probiotic Product (PROBIOTIC DAILY PO), Take 1 tablet by mouth Daily., Disp: , Rfl:     Physical Exam:    Physical Exam  Vitals reviewed.   Constitutional:       Appearance: Normal appearance. She is well-developed.   HENT:      Head: Normocephalic and atraumatic.      Right Ear: Hearing, tympanic membrane, ear canal and external ear normal.      Left Ear: Hearing, tympanic membrane, ear canal and external ear normal.      Nose: Nose normal.      Mouth/Throat:      Pharynx: Uvula midline.   Eyes:      General: Lids are normal.      Conjunctiva/sclera: Conjunctivae normal.      Pupils: Pupils are equal, round, and reactive to light.   Cardiovascular:      Rate and Rhythm: Normal rate and regular rhythm.      Heart sounds: Normal heart sounds.   Pulmonary:      Effort: Pulmonary effort is normal.      Breath sounds: Normal breath sounds.   Abdominal:      General: Bowel sounds are normal.      Palpations: Abdomen is soft.   Musculoskeletal:         General: Normal range of motion.        Arms:       Cervical back: Full passive range of motion without pain, normal range of motion and neck supple.   Skin:     General: Skin is warm and dry.   Neurological:      Mental Status: She is alert and oriented to person, place, and time.      Deep Tendon Reflexes: Reflexes are normal and symmetric.   Psychiatric:         Speech: Speech normal.         Behavior: Behavior normal.         Thought Content: Thought content normal.         Judgment: Judgment normal.         Procedures        Assessment/Plan   Problem List Items Addressed This Visit        Musculoskeletal and Injuries    Acute left-sided low  back pain without sciatica - Primary    Overview     X-rays today of thoracic and lumbar spine.  Patient has excellent range of motion.  She does have pain with trunk rotation.  Consider massage therapy and physical therapy if x-ray is unremarkable.         Relevant Orders    XR Spine Thoracic 3 View (Completed)    XR Spine Lumbar 2 or 3 View (Completed)          Return if symptoms worsen or fail to improve.    Plan of care reviewed with patient at the conclusion of today's visit. Education was provided regarding diagnosis, management, and any prescribed or recommended OTC medications.Patient verbalizes understanding of and agreement with management plan.     I spent 30 minutes caring for Miriam on this date of service. This time includes time spent by me in the following activities:preparing for the visit, reviewing tests, obtaining and/or reviewing a separately obtained history, performing a medically appropriate examination and/or evaluation , counseling and educating the patient/family/caregiver, ordering medications, tests, or procedures and documenting information in the medical record    Agueda Scott PA-C    Please note that portions of this note were completed with a voice recognition program. Efforts were made to edit the dictations, but occasionally words are mistranscribed.

## 2021-10-25 DIAGNOSIS — M54.50 ACUTE LEFT-SIDED LOW BACK PAIN WITHOUT SCIATICA: Primary | ICD-10-CM

## 2021-10-28 ENCOUNTER — TELEPHONE (OUTPATIENT)
Dept: INTERNAL MEDICINE | Facility: CLINIC | Age: 61
End: 2021-10-28

## 2021-10-28 DIAGNOSIS — M54.42 ACUTE MIDLINE LOW BACK PAIN WITH LEFT-SIDED SCIATICA: Primary | ICD-10-CM

## 2021-10-28 NOTE — TELEPHONE ENCOUNTER
LVM for pt to return call regarding clarification on where patient would like to go for massage therapy- number listed is a cell phone not an office

## 2021-10-28 NOTE — TELEPHONE ENCOUNTER
Caller: Walt Alix    Relationship: Self    Best call back number: [phone]    What specialty or service is being requested: PHYSICAL THERAPY     What is the provider, practice or medical service name: [facility]     What is the office location: [address] [place] [address]    What is the office phone number: [phone]    Any additional details:     AND     What specialty or service is being requested: MASSAGE THERAPIST     What is the provider, practice or medical service name: [facility]    What is the office location: [facility] IN [place]     What is the office phone number: [phone]    Any additional details:

## 2021-11-30 ENCOUNTER — TELEPHONE (OUTPATIENT)
Dept: INTERNAL MEDICINE | Facility: CLINIC | Age: 61
End: 2021-11-30

## 2022-01-03 ENCOUNTER — OFFICE VISIT (OUTPATIENT)
Dept: INTERNAL MEDICINE | Facility: CLINIC | Age: 62
End: 2022-01-03

## 2022-01-03 VITALS
WEIGHT: 125 LBS | TEMPERATURE: 98.2 F | DIASTOLIC BLOOD PRESSURE: 66 MMHG | SYSTOLIC BLOOD PRESSURE: 102 MMHG | BODY MASS INDEX: 22.15 KG/M2 | HEIGHT: 63 IN | HEART RATE: 68 BPM

## 2022-01-03 DIAGNOSIS — Z00.00 ANNUAL PHYSICAL EXAM: Primary | ICD-10-CM

## 2022-01-03 DIAGNOSIS — J30.9 CHRONIC ALLERGIC RHINITIS: ICD-10-CM

## 2022-01-03 DIAGNOSIS — Z13.228 SCREENING FOR METABOLIC DISORDER: ICD-10-CM

## 2022-01-03 DIAGNOSIS — Z13.220 SCREENING FOR CHOLESTEROL LEVEL: ICD-10-CM

## 2022-01-03 PROCEDURE — 99396 PREV VISIT EST AGE 40-64: CPT | Performed by: INTERNAL MEDICINE

## 2022-01-03 NOTE — PROGRESS NOTES
[facility]     Miriam Godoy  1960   [id number]      Patient Care Team:  Bishnu Riddle MD as PCP - General (Internal Medicine)    Chief Complaint::   Chief Complaint   Patient presents with   • Annual Exam     patient is not fasting        HPI    The patient has consented to being recorded using [employer].    The patient presents today for her annual examination, as well as in follow-up regarding her allergic rhinitis.    She reports she is doing well. The patient notes she has been attending physical therapy and her chiropractor, Parish Stoll in [place], due to slight back injury from a deep twist while in the shower. She notes that she meditates daily. The patient notes she is up to date on her gynecologic care, mammogram, and had her colonoscopy completed approximately 2 years ago. She denies problems with her strength, stamina, shortness of breath, chest pain.     Chronic Conditions:      Patient Active Problem List   Diagnosis   • Premature ventricular contractions (PVCs) (VPCs)   • Rash   • Chronic allergic rhinitis   • Internal nasal lesion   • Hordeolum internum   • Annual physical exam   • Acute left-sided low back pain without sciatica        Past Medical History:   Diagnosis Date   • Aortic root dilatation (HCC)    • Atypical chest pain    • History of echocardiogram 04/16/2015    showing estimated EF 55% to 60% with mild aortic regurgitation and moderate dilatation of the aortic root   • History of EKG     normal   • History of palpitations    • Osteopenia        Past Surgical History:   Procedure Laterality Date   • APPENDECTOMY     • OOPHORECTOMY Bilateral        Family History   Problem Relation Age of Onset   • Breast cancer Mother 43   • Breast cancer Paternal Grandmother 47   • Coronary artery disease Father 57        premature   • Ovarian cancer Neg Hx        Social History     Socioeconomic History   • Marital status:    Tobacco Use   • Smoking status: Former  Smoker     Packs/day: 0.50     Years: 9.00     Pack years: 4.50     Types: Cigarettes     Quit date:      Years since quittin.0   • Smokeless tobacco: Never Used   Substance and Sexual Activity   • Alcohol use: Yes     Comment: rare   • Drug use: No   • Sexual activity: Defer       Allergies   Allergen Reactions   • Penicillins Rash         Current Outpatient Medications:   •  BIOTIN PO, Take 1 tablet by mouth Daily., Disp: , Rfl:   •  CALCIUM-MAGNESIUM-ZINC PO, Take 1 tablet by mouth Daily., Disp: , Rfl:   •  Cholecalciferol (VITAMIN D-3) 5000 units tablet, Take 1 tablet by mouth Daily., Disp: , Rfl:   •  Multiple Vitamins-Minerals (MULTIVITAMIN ADULT PO), Take 1 tablet by mouth Daily., Disp: , Rfl:   •  Multiple Vitamins-Minerals (PRESERVISION AREDS PO), Take 1 tablet by mouth Daily., Disp: , Rfl:   •  Omega-3 Fatty Acids (FISH OIL) 1000 MG capsule capsule, Take 1,000 mg by mouth Daily With Breakfast., Disp: , Rfl:   •  Probiotic Product (PROBIOTIC DAILY PO), Take 1 tablet by mouth Daily., Disp: , Rfl:     Immunization History   Administered Date(s) Administered   • COVID-19 (PFIZER) 2021, 2021   • Flu Vaccine Quad PF >18YRS 10/12/2021   • Flu Vaccine Quad PF >36MO 2017, 2018   • FluLaval/Fluarix/Fluzone >6 2020   • Fluzone High Dose =>65 Years ([facility] ONLY) 2016   • Hepatitis A 2018, 2018   • Tdap 2016        Health Maintenance Due   Topic Date Due   • ZOSTER VACCINE (1 of 2) Never done   • HEPATITIS C SCREENING  Never done   • PAP SMEAR  Never done   • COVID-19 Vaccine (3 - Booster for Pfizer series) 2021        Review of Systems   Constitutional: Negative for chills, fatigue and fever.   HENT: Negative for congestion, ear pain and sinus pressure.    Respiratory: Negative for cough, chest tightness, shortness of breath and wheezing.    Cardiovascular: Negative for chest pain and palpitations.   Gastrointestinal: Negative for abdominal pain,  "blood in stool and constipation.   Skin: Negative for color change.   Allergic/Immunologic: Negative for environmental allergies.   Neurological: Negative for dizziness, speech difficulty and headache.   Psychiatric/Behavioral: Negative for decreased concentration. The patient is not nervous/anxious.         Vital Signs  Vitals:    01/03/22 1540   BP: 102/66   BP Location: Left arm   Patient Position: Sitting   Cuff Size: Adult   Pulse: 68   Temp: 98.2 °F (36.8 °C)   TempSrc: Infrared   Weight: 56.7 kg (125 lb)  Comment: with clothes and shoes   Height: 160 cm (63\")   PainSc: 0-No pain       Physical Exam  Vitals and nursing note reviewed.   Constitutional:       Appearance: She is well-developed.   HENT:      Head: Normocephalic and atraumatic.      Right Ear: External ear normal.      Left Ear: External ear normal.      Nose: Nose normal.      Mouth/Throat:      Pharynx: No oropharyngeal exudate.   Eyes:      Conjunctiva/sclera: Conjunctivae normal.      Pupils: Pupils are equal, round, and reactive to light.   Neck:      Thyroid: No thyromegaly.      Vascular: No JVD.   Cardiovascular:      Rate and Rhythm: Normal rate and regular rhythm.      Heart sounds: Normal heart sounds. No murmur heard.  No friction rub. No gallop.    Pulmonary:      Effort: Pulmonary effort is normal. No respiratory distress.      Breath sounds: Normal breath sounds. No wheezing or rales.   Chest:      Chest wall: No tenderness.   Abdominal:      General: Bowel sounds are normal. There is no distension.      Palpations: Abdomen is soft. There is no mass.      Tenderness: There is no abdominal tenderness. There is no guarding or rebound.      Hernia: No hernia is present.   Musculoskeletal:         General: No tenderness. Normal range of motion.      Cervical back: Normal range of motion and neck supple.   Lymphadenopathy:      Cervical: No cervical adenopathy.   Skin:     General: Skin is warm and dry.      Findings: No erythema or rash. "   Neurological:      Mental Status: She is alert and oriented to person, place, and time.      Cranial Nerves: No cranial nerve deficit.      Sensory: No sensory deficit.      Motor: No abnormal muscle tone.      Coordination: Coordination normal.      Deep Tendon Reflexes: Reflexes normal.   Psychiatric:         Behavior: Behavior normal.         Thought Content: Thought content normal.         Judgment: Judgment normal.          Procedures     Fall Risk Screen:  GONZALO Fall Risk Assessment has not been completed.    Health Habits and Functional and Cognitive Screening:  No flowsheet data found.    Depression Sreening  PHQ-9 Total Score:       ACE III MINI             Assessment/Plan:    Diagnoses and all orders for this visit:    1. Annual physical exam (Primary)        - Overall she remains in excellent health with good lifestyle habits. She exercises regularly and maintains a healthy diet, as well as meditates daily. She is up to date on GYN care, mammography, and colorectal screening. I did recommend that she go ahead and receive the booster dose for her COVID-19 vaccine.     2. Chronic allergic rhinitis    3. Screening for cholesterol level    4. Screening for metabolic disorder  -     Comprehensive Metabolic Panel; Future  -     Lipid Panel; Future      She will follow-up in 1 year.      Labs  Results for orders placed or performed in visit on 09/30/20   Comprehensive Metabolic Panel    Specimen: Blood   Result Value Ref Range    Glucose 78 65 - 99 mg/dL    BUN 16 8 - 23 mg/dL    Creatinine 0.85 0.57 - 1.00 mg/dL    Sodium 139 136 - 145 mmol/L    Potassium 4.0 3.5 - 5.2 mmol/L    Chloride 98 98 - 107 mmol/L    CO2 28.0 22.0 - 29.0 mmol/L    Calcium 9.9 8.6 - 10.5 mg/dL    Total Protein 7.4 6.0 - 8.5 g/dL    Albumin 4.30 3.50 - 5.20 g/dL    ALT (SGPT) 17 1 - 33 U/L    AST (SGOT) 23 1 - 32 U/L    Alkaline Phosphatase 105 39 - 117 U/L    Total Bilirubin 0.4 0.0 - 1.2 mg/dL    eGFR Non African Amer 68 >60  mL/min/1.73    Globulin 3.1 gm/dL    A/G Ratio 1.4 g/dL    BUN/Creatinine Ratio 18.8 7.0 - 25.0    Anion Gap 13.0 5.0 - 15.0 mmol/L   Lipid Panel    Specimen: Blood   Result Value Ref Range    Total Cholesterol 164 0 - 200 mg/dL    Triglycerides 77 0 - 150 mg/dL    HDL Cholesterol 57 40 - 60 mg/dL    LDL Cholesterol  92 0 - 100 mg/dL    VLDL Cholesterol 15.4 5 - 40 mg/dL    LDL/HDL Ratio 1.61    TSH    Specimen: Blood   Result Value Ref Range    TSH 2.390 0.270 - 4.200 uIU/mL   Vitamin B12    Specimen: Blood   Result Value Ref Range    Vitamin B-12 1,597 (H) 211 - 946 pg/mL   CBC Auto Differential    Specimen: Blood   Result Value Ref Range    WBC 5.75 3.40 - 10.80 10*3/mm3    RBC 4.49 3.77 - 5.28 10*6/mm3    Hemoglobin 13.9 12.0 - 15.9 g/dL    Hematocrit 41.9 34.0 - 46.6 %    MCV 93.3 79.0 - 97.0 fL    MCH 31.0 26.6 - 33.0 pg    MCHC 33.2 31.5 - 35.7 g/dL    RDW 13.1 12.3 - 15.4 %    RDW-SD 45.3 37.0 - 54.0 fl    MPV 10.3 6.0 - 12.0 fL    Platelets 263 140 - 450 10*3/mm3    Neutrophil % 47.4 42.7 - 76.0 %    Lymphocyte % 41.9 19.6 - 45.3 %    Monocyte % 7.5 5.0 - 12.0 %    Eosinophil % 2.1 0.3 - 6.2 %    Basophil % 0.9 0.0 - 1.5 %    Immature Grans % 0.2 0.0 - 0.5 %    Neutrophils, Absolute 2.73 1.70 - 7.00 10*3/mm3    Lymphocytes, Absolute 2.41 0.70 - 3.10 10*3/mm3    Monocytes, Absolute 0.43 0.10 - 0.90 10*3/mm3    Eosinophils, Absolute 0.12 0.00 - 0.40 10*3/mm3    Basophils, Absolute 0.05 0.00 - 0.20 10*3/mm3    Immature Grans, Absolute 0.01 0.00 - 0.05 10*3/mm3    nRBC 0.0 0.0 - 0.2 /100 WBC        Counseling was given to patient for the following topics: appropriate exercise as she is doing, disease prevention and healthy eating habits.    Plan of care reviewed with patient at the conclusion of today's visit. Education was provided regarding diagnosis, management, and any prescribed or recommended OTC medications.Patient verbalizes understanding of and agreement with management plan.         Bishnu WALLACE  MD Janessa       Transcribed from ambient dictation for Bishnu Riddle MD by Valery Scott.  01/03/22   19:00 EST    Patient verbalized consent to the visit recording.

## 2022-01-14 ENCOUNTER — LAB (OUTPATIENT)
Dept: LAB | Facility: HOSPITAL | Age: 62
End: 2022-01-14

## 2022-01-14 DIAGNOSIS — Z13.228 SCREENING FOR METABOLIC DISORDER: ICD-10-CM

## 2022-01-14 LAB
ALBUMIN SERPL-MCNC: 4.3 G/DL (ref 3.5–5.2)
ALBUMIN/GLOB SERPL: 2 G/DL
ALP SERPL-CCNC: 89 U/L (ref 39–117)
ALT SERPL W P-5'-P-CCNC: 15 U/L (ref 1–33)
ANION GAP SERPL CALCULATED.3IONS-SCNC: 10.6 MMOL/L (ref 5–15)
AST SERPL-CCNC: 20 U/L (ref 1–32)
BILIRUB SERPL-MCNC: 0.3 MG/DL (ref 0–1.2)
BUN SERPL-MCNC: 13 MG/DL (ref 8–23)
BUN/CREAT SERPL: 16.5 (ref 7–25)
CALCIUM SPEC-SCNC: 9.1 MG/DL (ref 8.6–10.5)
CHLORIDE SERPL-SCNC: 103 MMOL/L (ref 98–107)
CHOLEST SERPL-MCNC: 170 MG/DL (ref 0–200)
CO2 SERPL-SCNC: 26.4 MMOL/L (ref 22–29)
CREAT SERPL-MCNC: 0.79 MG/DL (ref 0.57–1)
GFR SERPL CREATININE-BSD FRML MDRD: 74 ML/MIN/1.73
GLOBULIN UR ELPH-MCNC: 2.2 GM/DL
GLUCOSE SERPL-MCNC: 95 MG/DL (ref 65–99)
HDLC SERPL-MCNC: 59 MG/DL (ref 40–60)
LDLC SERPL CALC-MCNC: 99 MG/DL (ref 0–100)
LDLC/HDLC SERPL: 1.67 {RATIO}
POTASSIUM SERPL-SCNC: 4.3 MMOL/L (ref 3.5–5.2)
PROT SERPL-MCNC: 6.5 G/DL (ref 6–8.5)
SODIUM SERPL-SCNC: 140 MMOL/L (ref 136–145)
TRIGL SERPL-MCNC: 62 MG/DL (ref 0–150)
VLDLC SERPL-MCNC: 12 MG/DL (ref 5–40)

## 2022-01-14 PROCEDURE — 80053 COMPREHEN METABOLIC PANEL: CPT

## 2022-01-14 PROCEDURE — 80061 LIPID PANEL: CPT

## 2022-04-20 ENCOUNTER — OFFICE VISIT (OUTPATIENT)
Dept: INTERNAL MEDICINE | Facility: CLINIC | Age: 62
End: 2022-04-20

## 2022-04-20 VITALS
TEMPERATURE: 98.4 F | BODY MASS INDEX: 20.77 KG/M2 | WEIGHT: 117.2 LBS | SYSTOLIC BLOOD PRESSURE: 96 MMHG | DIASTOLIC BLOOD PRESSURE: 68 MMHG | HEART RATE: 64 BPM | HEIGHT: 63 IN

## 2022-04-20 DIAGNOSIS — J30.9 CHRONIC ALLERGIC RHINITIS: Primary | ICD-10-CM

## 2022-04-20 DIAGNOSIS — B02.9 HERPES ZOSTER WITHOUT COMPLICATION: ICD-10-CM

## 2022-04-20 PROCEDURE — 99213 OFFICE O/P EST LOW 20 MIN: CPT | Performed by: INTERNAL MEDICINE

## 2022-04-20 RX ORDER — DEXMETHYLPHENIDATE HYDROCHLORIDE 10 MG/1
1 TABLET ORAL DAILY PRN
COMMUNITY
Start: 2022-04-13 | End: 2023-03-20

## 2022-04-20 RX ORDER — POLYMYXIN B SULFATE AND TRIMETHOPRIM 1; 10000 MG/ML; [USP'U]/ML
1 SOLUTION OPHTHALMIC
COMMUNITY
Start: 2022-04-16 | End: 2023-03-20

## 2022-04-20 RX ORDER — FAMCICLOVIR 500 MG/1
500 TABLET ORAL 3 TIMES DAILY
Qty: 21 TABLET | Refills: 0 | Status: SHIPPED | OUTPATIENT
Start: 2022-04-20 | End: 2023-03-20

## 2022-04-20 NOTE — PROGRESS NOTES
"[facility]     Miriam Godoy  1960   [id number]      Patient Care Team:  Bishnu Riddle MD as PCP - General (Internal Medicine)    Chief Complaint::   Chief Complaint   Patient presents with   • Rash     Top of left thigh - painful   • head congestion     Greenish drainage        HPI  The patient comes in complaining of a painful rash on the left thigh and head congestion producing green drainage.    Allergic rhinitis  The patient notes her symptoms started out with \"watery eyes.\" She went to the urgent treatment center for an evaluation, and they advised her she had pinkeye. She had discharge from her eye, and was provided with eye drops. She utilizes the eye drops every 3 hours as advised, with improvement. However, over the \"weekend\" per her report she had sternutation   and was \"blowing her nose all day.\" She notes expectoration of green sputum. The patient denies fever or body aches. She admits to occasional use of Sudafed as-needed.     Rash  The patient has complaints of a rash, painful in nature. She notes the onset of her rash appeared on 04/16/2022. She endorse receiving 1 injection of Zostavax years ago.     Chronic Conditions:      Patient Active Problem List   Diagnosis   • Premature ventricular contractions (PVCs) (VPCs)   • Rash   • Chronic allergic rhinitis   • Internal nasal lesion   • Hordeolum internum   • Annual physical exam   • Acute left-sided low back pain without sciatica        Past Medical History:   Diagnosis Date   • Aortic root dilatation (HCC)    • Atypical chest pain    • History of echocardiogram 04/16/2015    showing estimated EF 55% to 60% with mild aortic regurgitation and moderate dilatation of the aortic root   • History of EKG     normal   • History of palpitations    • Osteopenia        Past Surgical History:   Procedure Laterality Date   • APPENDECTOMY     • OOPHORECTOMY Bilateral        Family History   Problem Relation Age of Onset   • Breast " cancer Mother 43   • Breast cancer Paternal Grandmother 47   • Coronary artery disease Father 57        premature   • Ovarian cancer Neg Hx        Social History     Socioeconomic History   • Marital status:    Tobacco Use   • Smoking status: Former Smoker     Packs/day: 0.50     Years: 9.00     Pack years: 4.50     Types: Cigarettes     Quit date:      Years since quittin.3   • Smokeless tobacco: Never Used   Substance and Sexual Activity   • Alcohol use: Yes     Comment: rare   • Drug use: No   • Sexual activity: Defer       Allergies   Allergen Reactions   • Penicillins Rash         Current Outpatient Medications:   •  BIOTIN PO, Take 1 tablet by mouth Daily., Disp: , Rfl:   •  CALCIUM-MAGNESIUM-ZINC PO, Take 1 tablet by mouth Daily., Disp: , Rfl:   •  Cholecalciferol (VITAMIN D-3) 5000 units tablet, Take 1 tablet by mouth Daily., Disp: , Rfl:   •  dexmethylphenidate (FOCALIN) 10 MG tablet, Take 1 tablet by mouth Daily As Needed., Disp: , Rfl:   •  Multiple Vitamins-Minerals (MULTIVITAMIN ADULT PO), Take 1 tablet by mouth Daily., Disp: , Rfl:   •  Omega-3 Fatty Acids (FISH OIL) 1000 MG capsule capsule, Take 1,000 mg by mouth Daily With Breakfast., Disp: , Rfl:   •  Probiotic Product (PROBIOTIC DAILY PO), Take 1 tablet by mouth Daily., Disp: , Rfl:   •  trimethoprim-polymyxin b (POLYTRIM) 06315-6.1 UNIT/ML-% ophthalmic solution, Apply 1 drop to eye(s) as directed by provider Every 3 (Three) Hours As Needed., Disp: , Rfl:   •  famciclovir (FAMVIR) 500 MG tablet, Take 1 tablet by mouth 3 (Three) Times a Day., Disp: 21 tablet, Rfl: 0    Review of Systems   Constitutional: Negative for chills, fatigue and fever.   HENT: Negative for congestion, ear pain and sinus pressure.    Respiratory: Negative for cough, chest tightness, shortness of breath and wheezing.    Cardiovascular: Negative for chest pain and palpitations.   Gastrointestinal: Negative for abdominal pain, blood in stool and constipation.  "  Skin: Negative for color change.   Allergic/Immunologic: Negative for environmental allergies.   Neurological: Negative for dizziness, speech difficulty and headache.   Psychiatric/Behavioral: Negative for decreased concentration. The patient is not nervous/anxious.         Vital Signs  Vitals:    04/20/22 1127   BP: 96/68   BP Location: Left arm   Patient Position: Sitting   Cuff Size: Adult   Pulse: 64   Temp: 98.4 °F (36.9 °C)   Weight: 53.2 kg (117 lb 3.2 oz)   Height: 160 cm (62.99\")   PainSc:   2   PainLoc: Leg  Comment: thigh       Physical Exam  Vitals reviewed.   Constitutional:       Appearance: She is well-developed.   HENT:      Head: Normocephalic and atraumatic.   Cardiovascular:      Rate and Rhythm: Normal rate and regular rhythm.      Heart sounds: Normal heart sounds. No murmur heard.  Pulmonary:      Effort: Pulmonary effort is normal.      Breath sounds: Normal breath sounds.   Skin:     Findings: Erythema present.      Comments: Mild posterior pharyngeal erythema.   She has a raised blistering rash just below the left buttock posteriorly.   Neurological:      Mental Status: She is alert and oriented to person, place, and time.          Procedures    ACE III MINI             Assessment/Plan:    Diagnoses and all orders for this visit:    1. Chronic allergic rhinitis (Primary)    2. Herpes zoster without complication    Other orders  -     famciclovir (FAMVIR) 500 MG tablet; Take 1 tablet by mouth 3 (Three) Times a Day.  Dispense: 21 tablet; Refill: 0        1. Allergic rhinitis       -  No evidence of bacterial infection.        -  She may treat this with over the counter antihistamines or nasal steroids.    2. Herpes zoster       -  Famvir 500 mg 3 times daily for 7 days.        -  Her discomfort is minimal, so there is no indication for nerve modulating medications, but at some point she needs to have Shingrix.        -  Of note, she did have Zostavax.    Plan of care reviewed with patient at " the conclusion of today's visit. Education was provided regarding diagnosis, management, and any prescribed or recommended OTC medications.Patient verbalizes understanding of and agreement with management plan.         Bishnu Riddle MD

## 2022-08-17 ENCOUNTER — OFFICE VISIT (OUTPATIENT)
Dept: INTERNAL MEDICINE | Facility: CLINIC | Age: 62
End: 2022-08-17

## 2022-08-17 VITALS
HEART RATE: 64 BPM | WEIGHT: 117.4 LBS | SYSTOLIC BLOOD PRESSURE: 90 MMHG | HEIGHT: 63 IN | TEMPERATURE: 98.4 F | DIASTOLIC BLOOD PRESSURE: 54 MMHG | BODY MASS INDEX: 20.8 KG/M2

## 2022-08-17 DIAGNOSIS — H91.92 HEARING-RELATED SYMPTOM OF LEFT EAR: Primary | ICD-10-CM

## 2022-08-17 PROCEDURE — 99213 OFFICE O/P EST LOW 20 MIN: CPT | Performed by: STUDENT IN AN ORGANIZED HEALTH CARE EDUCATION/TRAINING PROGRAM

## 2022-08-17 NOTE — PROGRESS NOTES
"Chief Complaint  Miriam Godoy is a 61 y.o. female presenting for something in ear (Left ).     Patient has a past medical history of PVCs, allergic tinnitus, hordeolum and lower back pain.    History of Present Illness  Patient is here to get a check of her left ear.  She got water in recently and felt it was blocked, afterwards she used a Q-tip and felt that maybe part of it got stuck in the ear canal.  The area is not muffled, but I believe mildly decreased, she has noticed some altered sounds.  No fever or chills.  No recent respiratory infection, no congestion or sinus issues, no allergies.    The following portions of the patient's history were reviewed and updated as appropriate: allergies, current medications, past family history, past medical history, past social history, past surgical history and problem list.    Objective  BP 90/54 (BP Location: Left arm, Patient Position: Sitting, Cuff Size: Adult)   Pulse 64   Temp 98.4 °F (36.9 °C) (Temporal)   Ht 160 cm (62.99\")   Wt 53.3 kg (117 lb 6.4 oz)   BMI 20.80 kg/m²     Physical Exam  Constitutional:       Appearance: Normal appearance.   HENT:      Right Ear: Ear canal and external ear normal. There is no impacted cerumen.      Left Ear: Ear canal and external ear normal. There is no impacted cerumen.      Ears:      Comments: Looks like there is little bit of fluid behind eardrums on both sides.  No redness or inflammation at all.  There is no foreign body, and just scant amounts of cerumen.  Eyes:      Extraocular Movements: Extraocular movements intact.      Conjunctiva/sclera: Conjunctivae normal.   Neurological:      Mental Status: She is alert and oriented to person, place, and time. Mental status is at baseline.   Psychiatric:         Behavior: Behavior normal.         Thought Content: Thought content normal.         Assessment/Plan   1. Hearing-related symptom of left ear  No concern for foreign body.  There might be some fluid in the middle " ear, so I would do Valsalva to help for symptom relief.  She can also try over-the-counter nasal steroid like Flonase or oxymetazoline nasal spray for a few days.  If any worsening symptoms, specifically fever, chills or worsening ear pain she should get back in touch.      Return if symptoms worsen or fail to improve, for Next scheduled follow up.    Future Appointments       Provider Department Center    1/16/2023 1:30 PM Bishnu Riddle MD [facility]IC[facility]            Ernst Whitehead MD  Family Medicine  08/17/2022

## 2022-10-17 ENCOUNTER — TRANSCRIBE ORDERS (OUTPATIENT)
Dept: ADMINISTRATIVE | Facility: HOSPITAL | Age: 62
End: 2022-10-17

## 2022-10-17 DIAGNOSIS — Z12.31 VISIT FOR SCREENING MAMMOGRAM: Primary | ICD-10-CM

## 2022-11-16 ENCOUNTER — HOSPITAL ENCOUNTER (OUTPATIENT)
Dept: MAMMOGRAPHY | Facility: HOSPITAL | Age: 62
Discharge: HOME | End: 2022-11-16
Admitting: INTERNAL MEDICINE

## 2022-11-16 DIAGNOSIS — Z12.31 VISIT FOR SCREENING MAMMOGRAM: ICD-10-CM

## 2022-11-16 PROCEDURE — 77067 SCR MAMMO BI INCL CAD: CPT | Performed by: RADIOLOGY

## 2022-11-16 PROCEDURE — 77063 BREAST TOMOSYNTHESIS BI: CPT | Performed by: RADIOLOGY

## 2022-11-16 PROCEDURE — 77067 SCR MAMMO BI INCL CAD: CPT

## 2022-11-16 PROCEDURE — 77063 BREAST TOMOSYNTHESIS BI: CPT

## 2022-11-21 ENCOUNTER — TRANSCRIBE ORDERS (OUTPATIENT)
Dept: ADMINISTRATIVE | Facility: HOSPITAL | Age: 62
End: 2022-11-21

## 2022-11-21 DIAGNOSIS — Z78.0 POST-MENOPAUSAL: Primary | ICD-10-CM

## 2023-01-23 ENCOUNTER — HOSPITAL ENCOUNTER (OUTPATIENT)
Dept: BONE DENSITY | Facility: HOSPITAL | Age: 63
Discharge: HOME | End: 2023-01-23
Admitting: ADVANCED PRACTICE MIDWIFE
Payer: COMMERCIAL

## 2023-01-23 DIAGNOSIS — Z78.0 POST-MENOPAUSAL: ICD-10-CM

## 2023-01-23 PROCEDURE — 77080 DXA BONE DENSITY AXIAL: CPT

## 2023-03-10 ENCOUNTER — TELEPHONE (OUTPATIENT)
Dept: INTERNAL MEDICINE | Facility: CLINIC | Age: 63
End: 2023-03-10

## 2023-03-10 DIAGNOSIS — Z13.228 SCREENING FOR METABOLIC DISORDER: ICD-10-CM

## 2023-03-10 DIAGNOSIS — Z13.220 LIPID SCREENING: Primary | ICD-10-CM

## 2023-03-10 DIAGNOSIS — Z11.59 ENCOUNTER FOR HEPATITIS C SCREENING TEST FOR LOW RISK PATIENT: ICD-10-CM

## 2023-03-10 NOTE — TELEPHONE ENCOUNTER
Caller: Miriam Godoy    Relationship: Self    Best call back number: [phone]    What orders are you requesting (i.e. lab or imaging): LABS FOR PHYSICAL

## 2023-03-17 ENCOUNTER — LAB (OUTPATIENT)
Dept: LAB | Facility: HOSPITAL | Age: 63
End: 2023-03-17
Payer: COMMERCIAL

## 2023-03-17 DIAGNOSIS — Z13.220 LIPID SCREENING: ICD-10-CM

## 2023-03-17 DIAGNOSIS — Z11.59 ENCOUNTER FOR HEPATITIS C SCREENING TEST FOR LOW RISK PATIENT: ICD-10-CM

## 2023-03-17 DIAGNOSIS — Z13.228 SCREENING FOR METABOLIC DISORDER: ICD-10-CM

## 2023-03-17 LAB
ALBUMIN SERPL-MCNC: 4.6 G/DL (ref 3.5–5.2)
ALBUMIN/GLOB SERPL: 1.8 G/DL
ALP SERPL-CCNC: 91 U/L (ref 39–117)
ALT SERPL W P-5'-P-CCNC: 19 U/L (ref 1–33)
ANION GAP SERPL CALCULATED.3IONS-SCNC: 9.1 MMOL/L (ref 5–15)
AST SERPL-CCNC: 19 U/L (ref 1–32)
BILIRUB SERPL-MCNC: 0.4 MG/DL (ref 0–1.2)
BUN SERPL-MCNC: 21 MG/DL (ref 8–23)
BUN/CREAT SERPL: 24.1 (ref 7–25)
CALCIUM SPEC-SCNC: 10.2 MG/DL (ref 8.6–10.5)
CHLORIDE SERPL-SCNC: 101 MMOL/L (ref 98–107)
CHOLEST SERPL-MCNC: 210 MG/DL (ref 0–200)
CO2 SERPL-SCNC: 29.9 MMOL/L (ref 22–29)
CREAT SERPL-MCNC: 0.87 MG/DL (ref 0.57–1)
EGFRCR SERPLBLD CKD-EPI 2021: 74.5 ML/MIN/1.73
GLOBULIN UR ELPH-MCNC: 2.6 GM/DL
GLUCOSE SERPL-MCNC: 106 MG/DL (ref 65–99)
HCV AB SER DONR QL: NORMAL
HDLC SERPL-MCNC: 81 MG/DL (ref 40–60)
LDLC SERPL CALC-MCNC: 116 MG/DL (ref 0–100)
LDLC/HDLC SERPL: 1.41 {RATIO}
POTASSIUM SERPL-SCNC: 4.4 MMOL/L (ref 3.5–5.2)
PROT SERPL-MCNC: 7.2 G/DL (ref 6–8.5)
SODIUM SERPL-SCNC: 140 MMOL/L (ref 136–145)
TRIGL SERPL-MCNC: 74 MG/DL (ref 0–150)
VLDLC SERPL-MCNC: 13 MG/DL (ref 5–40)

## 2023-03-17 PROCEDURE — 80061 LIPID PANEL: CPT

## 2023-03-17 PROCEDURE — 80053 COMPREHEN METABOLIC PANEL: CPT

## 2023-03-17 PROCEDURE — 86803 HEPATITIS C AB TEST: CPT

## 2023-03-20 ENCOUNTER — OFFICE VISIT (OUTPATIENT)
Dept: INTERNAL MEDICINE | Facility: CLINIC | Age: 63
End: 2023-03-20
Payer: COMMERCIAL

## 2023-03-20 VITALS
HEIGHT: 65 IN | SYSTOLIC BLOOD PRESSURE: 98 MMHG | WEIGHT: 114.4 LBS | BODY MASS INDEX: 19.06 KG/M2 | TEMPERATURE: 98 F | DIASTOLIC BLOOD PRESSURE: 64 MMHG | HEART RATE: 68 BPM

## 2023-03-20 DIAGNOSIS — R73.09 ABNORMAL GLUCOSE: ICD-10-CM

## 2023-03-20 DIAGNOSIS — Z00.00 PREVENTATIVE HEALTH CARE: Primary | ICD-10-CM

## 2023-03-20 DIAGNOSIS — E78.2 MIXED HYPERLIPIDEMIA: ICD-10-CM

## 2023-03-20 LAB
EXPIRATION DATE: NORMAL
HBA1C MFR BLD: 5.6 %
Lab: NORMAL

## 2023-03-20 RX ORDER — TRAZODONE HYDROCHLORIDE 50 MG/1
50 TABLET ORAL NIGHTLY
Qty: 30 TABLET | Refills: 2 | Status: SHIPPED | OUTPATIENT
Start: 2023-03-20

## 2023-03-20 NOTE — PROGRESS NOTES
Capillary Blood Specimen Collection  Capillary blood collection performed in right hand by Sirisha Aviles MA. Patient tolerated the procedure well without complications.   03/20/23   Sirisha Aviles MA

## 2023-03-20 NOTE — PROGRESS NOTES
[facility]     Miriam Godoy  1960   [id number]      Patient Care Team:  Bishnu Riddle MD as PCP - General (Internal Medicine)    Chief Complaint::   Chief Complaint   Patient presents with   • Annual Exam        HPI    The patient presents today for an annual examination.    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 She states that it has been a lot of stress. 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 She states that the technology about killed her. She states that she is staying awake at night. She states that she has never had insomnia, high blood pressure, or high blood glucose. She states that she has not been exercising as much. She states she was really interested to see what her blood work looks like. She states that she had a barbecue sandwich and a hamburger one day. She states that she is still meditating. 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 She states that she works out and goes hiking. She states that she is not out of breath. She states that she has been having more pain in her left arm and chest. She states that she is eating better. She states that she is trying all the things she did before to try to go to sleep. She states that they are not working anymore. She states that she is trying meditation, yoga, breathing and, relaxation. She states that she was given half of a Xanax and she slept like a baby. She states that she has only taken it 4 times in the last month. She states that she is not a medicine person. She states that she has tried melatonin and magnesium. She states they have not worked. She states that she used to take Benadryl, but it dried her up. She states that she does not want to get on a narcotic. She states that she does not want to take it every night, but every once in a while she has a feeling that she wants to sleep. She states that  she never knew what anxiety was. She states that she went through this last year, . She states that she goes to help out her mother and father every week. She states that her father is in the hospital and they are trying to figure out long term care.    Health maintenance  She states she has seen her gynecologist last year, . She states that she does all of her yearly things. She states that she just got a bone density scan. She states that she has osteopenia.      Chronic Conditions:      Patient Active Problem List   Diagnosis   • Premature ventricular contractions (PVCs) (VPCs)   • Rash   • Chronic allergic rhinitis   • Internal nasal lesion   • Hordeolum internum   • Annual physical exam   • Acute left-sided low back pain without sciatica   • Mixed hyperlipidemia        Past Medical History:   Diagnosis Date   • Aortic root dilatation (HCC)    • Atypical chest pain    • History of echocardiogram 2015    showing estimated EF 55% to 60% with mild aortic regurgitation and moderate dilatation of the aortic root   • History of EKG     normal   • History of palpitations    • Osteopenia        Past Surgical History:   Procedure Laterality Date   • APPENDECTOMY     • OOPHORECTOMY Bilateral        Family History   Problem Relation Age of Onset   • Breast cancer Mother 43   • Breast cancer Paternal Grandmother 47   • Coronary artery disease Father 57        premature   • Ovarian cancer Neg Hx        Social History     Socioeconomic History   • Marital status:    Tobacco Use   • Smoking status: Former     Packs/day: 0.50     Years: 9.00     Pack years: 4.50     Types: Cigarettes     Quit date:      Years since quittin.2   • Smokeless tobacco: Never   Substance and Sexual Activity   • Alcohol use: Yes     Comment: rare   • Drug use: No   • Sexual activity: Defer       Allergies   Allergen Reactions   • Penicillins Rash         Current Outpatient Medications:   •  BIOTIN PO, Take 1 tablet by  "mouth Daily., Disp: , Rfl:   •  CALCIUM-MAGNESIUM-ZINC PO, Take 1 tablet by mouth Daily., Disp: , Rfl:   •  Cholecalciferol (VITAMIN D-3) 5000 units tablet, Take 1 tablet by mouth Daily., Disp: , Rfl:   •  Multiple Vitamins-Minerals (MULTIVITAMIN ADULT PO), Take 1 tablet by mouth Daily., Disp: , Rfl:   •  Nutritional Supplements (NUTRITIONAL SUPPLEMENT PO), Take 1 capsule by mouth Daily. Quercitin, Disp: , Rfl:   •  Omega-3 Fatty Acids (FISH OIL) 1000 MG capsule capsule, Take 1 capsule by mouth Daily With Breakfast., Disp: , Rfl:   •  Probiotic Product (PROBIOTIC DAILY PO), Take 1 tablet by mouth Daily., Disp: , Rfl:   •  traZODone (DESYREL) 50 MG tablet, Take 1 tablet by mouth Every Night., Disp: 30 tablet, Rfl: 2    Immunization History   Administered Date(s) Administered   • COVID-19 (PFIZER) PURPLE CAP 01/27/2021, 02/17/2021, 01/23/2022   • Flu Vaccine Quad PF >36MO 09/06/2017, 12/17/2018   • FluLaval/Fluzone >6mos 09/28/2020   • Fluzone High Dose =>65 Years ([facility] ONLY) 01/05/2016   • Fluzone Quad >6mos (Multi-dose) 10/12/2021   • Hepatitis A 02/17/2018, 08/17/2018   • Tdap 01/05/2016        Health Maintenance Due   Topic Date Due   • ZOSTER VACCINE (1 of 2) Never done   • PAP SMEAR  Never done   • COVID-19 Vaccine (4 - Booster for Pfizer series) 03/20/2022   • INFLUENZA VACCINE  08/01/2022   • ANNUAL PHYSICAL  01/03/2023        Review of Systems   A review of systems was performed, and positive findings are noted in the HPI.    Vital Signs  Vitals:    03/20/23 1537   BP: 98/64   BP Location: Right arm   Patient Position: Sitting   Cuff Size: Adult   Pulse: 68   Temp: 98 °F (36.7 °C)   Weight: 51.9 kg (114 lb 6.4 oz)   Height: 163.8 cm (64.5\")   PainSc: 0-No pain       Physical Exam  Vitals and nursing note reviewed.   Constitutional:       Appearance: She is well-developed.   HENT:      Head: Normocephalic and atraumatic.      Right Ear: External ear normal.      Left Ear: External ear normal.      Nose: " Nose normal.      Mouth/Throat:      Pharynx: No oropharyngeal exudate.   Eyes:      Conjunctiva/sclera: Conjunctivae normal.      Pupils: Pupils are equal, round, and reactive to light.   Neck:      Thyroid: No thyromegaly.      Vascular: No JVD.   Cardiovascular:      Rate and Rhythm: Normal rate and regular rhythm.      Heart sounds: Normal heart sounds. No murmur heard.    No friction rub. No gallop.   Pulmonary:      Effort: Pulmonary effort is normal. No respiratory distress.      Breath sounds: Normal breath sounds. No wheezing or rales.   Chest:      Chest wall: No tenderness.   Abdominal:      General: Bowel sounds are normal. There is no distension.      Palpations: Abdomen is soft. There is no mass.      Tenderness: There is no abdominal tenderness. There is no guarding or rebound.      Hernia: No hernia is present.   Musculoskeletal:         General: No tenderness. Normal range of motion.      Cervical back: Normal range of motion and neck supple.   Lymphadenopathy:      Cervical: No cervical adenopathy.   Skin:     General: Skin is warm and dry.      Findings: No erythema or rash.   Neurological:      Mental Status: She is alert and oriented to person, place, and time.      Cranial Nerves: No cranial nerve deficit.      Sensory: No sensory deficit.      Motor: No abnormal muscle tone.      Coordination: Coordination normal.      Deep Tendon Reflexes: Reflexes normal.   Psychiatric:         Behavior: Behavior normal.         Thought Content: Thought content normal.         Judgment: Judgment normal.        Physical examination is normal.    Procedures     Fall Risk Screen:  [facility] Fall Risk Assessment has not been completed.    Health Habits and Functional and Cognitive Screening:  No flowsheet data found.    Depression Sreening  PHQ-9 Total Score: 0     ACE III MINI             Assessment/Plan:      1. Prevention  - Overall, she remains in very good health, although she is much more stressed than she  used to be and because of that, has not paid as much attention to exercise, meditation, and stress reduction. We discussed that at length. She is up to date on colorectal cancer screening. Will have the second Shingrix soon. She is up to date on gynecology care. DEXA scan recently showed osteopenia.    2. Abnormal glucose  - Hemoglobin A1c is pending. Her fasting glucose was slightly elevated.    3. Hyperlipidemia  - HDL was high, but LDL was uncharacteristically high. The treatment for now since she is at low risk for cardiovascular disease remains healthy diet and avoidance of weight gain. Next year, we will add apolipoprotein B.    Follow up in 1 year.    Diagnoses and all orders for this visit:    1. Preventative health care (Primary)    2. Abnormal glucose  -     POC Glycosylated Hemoglobin (Hb A1C)    3. Mixed hyperlipidemia    Other orders  -     traZODone (DESYREL) 50 MG tablet; Take 1 tablet by mouth Every Night.  Dispense: 30 tablet; Refill: 2      BMI is within normal parameters. No other follow-up for BMI required.        Labs  Results for orders placed or performed in visit on 03/20/23   POC Glycosylated Hemoglobin (Hb A1C)    Specimen: Blood   Result Value Ref Range    Hemoglobin A1C 5.6 %    Lot Number [id number]     Expiration Date 11/03/2024         Counseling was given to patient for the following topics: appropriate exercise 150 minutes/week, disease prevention and healthy eating habits.    Plan of care reviewed with patient at the conclusion of today's visit. Education was provided regarding diagnosis, management, and any prescribed or recommended OTC medications.Patient verbalizes understanding of and agreement with management plan.         Bishnu Riddle MD       Transcribed from ambient dictation for Bishnu Riddle MD by Adele Greenberg.  03/20/23   17:40 EDT    Patient or patient representative verbalized consent to the visit recording.  I have personally performed the services  described in this document as transcribed by the above individual, and it is both accurate and complete.

## 2023-03-27 ENCOUNTER — OFFICE VISIT (OUTPATIENT)
Dept: INTERNAL MEDICINE | Facility: CLINIC | Age: 63
End: 2023-03-27
Payer: COMMERCIAL

## 2023-03-27 VITALS
HEIGHT: 64 IN | BODY MASS INDEX: 19.56 KG/M2 | HEART RATE: 64 BPM | TEMPERATURE: 98.7 F | WEIGHT: 114.6 LBS | SYSTOLIC BLOOD PRESSURE: 98 MMHG | DIASTOLIC BLOOD PRESSURE: 64 MMHG

## 2023-03-27 DIAGNOSIS — J01.10 ACUTE NON-RECURRENT FRONTAL SINUSITIS: Primary | ICD-10-CM

## 2023-03-27 PROCEDURE — 99213 OFFICE O/P EST LOW 20 MIN: CPT | Performed by: INTERNAL MEDICINE

## 2023-03-27 RX ORDER — AZITHROMYCIN 250 MG/1
TABLET, FILM COATED ORAL
Qty: 6 TABLET | Refills: 0 | Status: SHIPPED | OUTPATIENT
Start: 2023-03-27

## 2023-03-27 NOTE — PROGRESS NOTES
[facility]     Miriam Godoy  1960   [id number]      Patient Care Team:  Bishnu Riddle MD as PCP - General (Internal Medicine)    Chief Complaint::   Chief Complaint   Patient presents with   • Sinusitis   • congestion        HPI  The patient comes in complaining of sinus congestion and cough.    Sinus congestion and cough  The patient states that she has a sinus infection. She states that she needs to be around her family this whole week and she does not want anyone sick. She states that she has green phlegm coming out of her head. She states that she started feeling a sore throat on Friday, 03/24/2022. She states that it was not bad and she was still working out and doing stuff. She states that it hit her yesterday, 03/26/2023. She denies any discomfort around her eyes or around her sinuses. She states that she uses a nasal wash, which helps a lot. She denies any fever. She states that she has a little bit of a cough, but it is not bad. She denies any drainage. She states that when it first started, she had a sore throat and it was thick stuff around her throat, but that is not there now. She states that she feels congested on that side. She denies any pressure behind her eyes. She states that she has a heaviness in her head.      Chronic Conditions:  Sinus congestion and cough.    Patient Active Problem List   Diagnosis   • Premature ventricular contractions (PVCs) (VPCs)   • Rash   • Chronic allergic rhinitis   • Internal nasal lesion   • Hordeolum internum   • Annual physical exam   • Acute left-sided low back pain without sciatica   • Mixed hyperlipidemia        Past Medical History:   Diagnosis Date   • Aortic root dilatation (HCC)    • Atypical chest pain    • History of echocardiogram 04/16/2015    showing estimated EF 55% to 60% with mild aortic regurgitation and moderate dilatation of the aortic root   • History of EKG     normal   • History of palpitations    • Osteopenia         Past Surgical History:   Procedure Laterality Date   • APPENDECTOMY     • OOPHORECTOMY Bilateral        Family History   Problem Relation Age of Onset   • Breast cancer Mother 43   • Breast cancer Paternal Grandmother 47   • Coronary artery disease Father 57        premature   • Ovarian cancer Neg Hx        Social History     Socioeconomic History   • Marital status:    Tobacco Use   • Smoking status: Former     Packs/day: 0.50     Years: 9.00     Pack years: 4.50     Types: Cigarettes     Quit date:      Years since quittin.2   • Smokeless tobacco: Never   Substance and Sexual Activity   • Alcohol use: Yes     Comment: rare   • Drug use: No   • Sexual activity: Defer       Allergies   Allergen Reactions   • Penicillins Rash         Current Outpatient Medications:   •  BIOTIN PO, Take 1 tablet by mouth Daily., Disp: , Rfl:   •  CALCIUM-MAGNESIUM-ZINC PO, Take 1 tablet by mouth Daily., Disp: , Rfl:   •  Cholecalciferol (VITAMIN D-3) 5000 units tablet, Take 1 tablet by mouth Daily., Disp: , Rfl:   •  Multiple Vitamins-Minerals (MULTIVITAMIN ADULT PO), Take 1 tablet by mouth Daily., Disp: , Rfl:   •  Nutritional Supplements (NUTRITIONAL SUPPLEMENT PO), Take 1 capsule by mouth Daily. Quercitin, Disp: , Rfl:   •  Omega-3 Fatty Acids (FISH OIL) 1000 MG capsule capsule, Take 1 capsule by mouth Daily With Breakfast., Disp: , Rfl:   •  Probiotic Product (PROBIOTIC DAILY PO), Take 1 tablet by mouth Daily., Disp: , Rfl:   •  azithromycin (Zithromax Z-Tc) 250 MG tablet, Take 2 tablets the first day, then 1 tablet daily for 4 days., Disp: 6 tablet, Rfl: 0  •  traZODone (DESYREL) 50 MG tablet, Take 1 tablet by mouth Every Night. (Patient not taking: Reported on 3/27/2023), Disp: 30 tablet, Rfl: 2    Review of Systems   Review of systems is otherwise unremarkable.    Vital Signs  Vitals:    23 1443   BP: 98/64   BP Location: Left arm   Patient Position: Sitting   Cuff Size: Adult   Pulse: 64   Temp:  "98.7 °F (37.1 °C)   Weight: 52 kg (114 lb 9.6 oz)   Height: 163.8 cm (64.49\")   PainSc: 0-No pain       Physical Exam  Vitals reviewed.   Constitutional:       Appearance: She is well-developed.   HENT:      Head: Normocephalic and atraumatic.      Right Ear: Tympanic membrane and ear canal normal.      Left Ear: Tympanic membrane and ear canal normal.      Nose:      Right Sinus: No maxillary sinus tenderness or frontal sinus tenderness.      Left Sinus: No maxillary sinus tenderness or frontal sinus tenderness.      Mouth/Throat:      Pharynx: Oropharynx is clear. Posterior oropharyngeal erythema present.   Cardiovascular:      Rate and Rhythm: Normal rate and regular rhythm.      Heart sounds: Normal heart sounds. No murmur heard.  Pulmonary:      Effort: Pulmonary effort is normal.      Breath sounds: Normal breath sounds.   Neurological:      Mental Status: She is alert and oriented to person, place, and time.        Physical examination is normal.    Procedures    ACE III MINI             Assessment/Plan:    1. Acute sinusitis  - She was given azithromycin and will continue over the counter symptomatic measures as needed.        Diagnoses and all orders for this visit:    1. Acute non-recurrent frontal sinusitis (Primary)    Other orders  -     azithromycin (Zithromax Z-Tc) 250 MG tablet; Take 2 tablets the first day, then 1 tablet daily for 4 days.  Dispense: 6 tablet; Refill: 0          Plan of care reviewed with patient at the conclusion of today's visit. Education was provided regarding diagnosis, management, and any prescribed or recommended OTC medications.Patient verbalizes understanding of and agreement with management plan.         Bishnu Riddle MD       Transcribed from ambient dictation for Bishnu Riddle MD by Adele Greenberg.  03/27/23   16:14 EDT    Patient or patient representative verbalized consent to the visit recording.  I have personally performed the services described in " this document as transcribed by the above individual, and it is both accurate and complete.

## 2023-06-09 ENCOUNTER — TELEPHONE (OUTPATIENT)
Dept: INTERNAL MEDICINE | Facility: CLINIC | Age: 63
End: 2023-06-09
Payer: COMMERCIAL

## 2023-06-09 DIAGNOSIS — I95.9 HYPOTENSION, UNSPECIFIED HYPOTENSION TYPE: Primary | ICD-10-CM

## 2023-06-09 DIAGNOSIS — R53.83 OTHER FATIGUE: ICD-10-CM

## 2023-06-09 NOTE — TELEPHONE ENCOUNTER
Caller: Miriam Godoy    Relationship: Self    Best call back number: [phone]     What is the medical concern/diagnosis: NOT BEING ABLE TO SLEEP, ADRENALS ARE 'SHOT', STRUGGLING WITH WEIGHT.     What specialty or service is being requested: ENDOCRINOLOGY    What is the provider, practice or medical service name: [facility]    What is the office location: [place]/ [address]/ [place]     Any additional details: PLEASE CALL WITH UPDATE.

## 2023-06-09 NOTE — TELEPHONE ENCOUNTER
Suggest we get serum cortisol first.  If that's normal, it rules out adrenal insufficiency.  Needs to be done first thing in the morning, lab opens at 8.  Does not require fasting.  I will order thyroid test as well.

## 2023-06-27 NOTE — TELEPHONE ENCOUNTER
I'm not sure what to do . Patient is out of town for the month of July and they can't get her in any sooner.    normal/well-groomed/no distress

## 2023-08-14 ENCOUNTER — OFFICE VISIT (OUTPATIENT)
Dept: INTERNAL MEDICINE | Facility: CLINIC | Age: 63
End: 2023-08-14
Payer: COMMERCIAL

## 2023-08-14 VITALS
SYSTOLIC BLOOD PRESSURE: 100 MMHG | WEIGHT: 114 LBS | HEIGHT: 64 IN | BODY MASS INDEX: 19.46 KG/M2 | HEART RATE: 60 BPM | DIASTOLIC BLOOD PRESSURE: 70 MMHG

## 2023-08-14 DIAGNOSIS — R21 RASH: Primary | ICD-10-CM

## 2023-08-14 PROCEDURE — 99213 OFFICE O/P EST LOW 20 MIN: CPT | Performed by: INTERNAL MEDICINE

## 2023-08-14 RX ORDER — HYDROXYZINE HYDROCHLORIDE 25 MG/1
25 TABLET, FILM COATED ORAL 3 TIMES DAILY PRN
Qty: 25 TABLET | Refills: 1 | Status: SHIPPED | OUTPATIENT
Start: 2023-08-14

## 2023-08-14 NOTE — PATIENT INSTRUCTIONS
Atarax 25 mg 3 times a day  Cool water bathing  If not improved to please notify us and inform that develops anger agitation with prednisone in the past  Return to see Dr. Riddle as scheduled or as needed

## 2023-08-14 NOTE — PROGRESS NOTES
"[facility]     Miriam Godoy  1960   [id number]      Patient Care Team:  Bishnu Riddle MD as PCP - General (Internal Medicine)    Chief Complaint::   Chief Complaint   Patient presents with    Rash     \"Everywhere\".  Itching. Taking po benadryl.             HPI    The patient is a 62-year-old female who is a patient of Dr. Bishnu Matamoros complaining of a rash everywhere and itching. She is taking Benadryl.    The patient's rash started on her back, and it felt like a mosquito bite in the middle. It spread last night and then it spread everywhere else. It itches. She felt like a mosquito bite a couple of days ago and she was scratching that. She denies taking any new medications.     She finished the Z-Tc a long time ago. She took Benadryl last night and this morning. It helped her sleep. It is not so itchy, but it is spreading.     She takes biotin, calcium, vitamin D, multivitamins, nutritional supplement, fish oil, and a probiotic. She has not started her Desyrel. 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 It was a lot, but they went on vacation, and she is sleeping fine. She denies any headache, dizziness, tearing, or wanting to scratch her eyes. She denies any trouble swallowing. She denies there is any edema in her throat. She denies any coughing or wheezing. She denies any chest pain or heart racing or skipping. She reports that she is allergic to PENICILLIN.     Her last rash was when she was a child when she took penicillin. Her blood pressure was elevated when she went to work, but it is fine now. She was in a camper this weekend. She was at a festival, and she may have had an insect bite in the center of her back. It is not raised. She has been on prednisone in the past, but it made her angry. She is intolerant of Prednisone.      Patient Active Problem List   Diagnosis    Premature ventricular contractions (PVCs) (VPCs)    Rash    Chronic allergic " rhinitis    Internal nasal lesion    Hordeolum internum    Annual physical exam    Acute left-sided low back pain without sciatica    Mixed hyperlipidemia        Past Medical History:   Diagnosis Date    Aortic root dilatation     Atypical chest pain     History of echocardiogram 2015    showing estimated EF 55% to 60% with mild aortic regurgitation and moderate dilatation of the aortic root    History of EKG     normal    History of palpitations     Osteopenia        Past Surgical History:   Procedure Laterality Date    APPENDECTOMY      OOPHORECTOMY Bilateral        Family History   Problem Relation Age of Onset    Breast cancer Mother 43    Breast cancer Paternal Grandmother 47    Coronary artery disease Father 57        premature    Ovarian cancer Neg Hx        Social History     Socioeconomic History    Marital status:    Tobacco Use    Smoking status: Former     Packs/day: 0.50     Years: 9.00     Pack years: 4.50     Types: Cigarettes     Quit date:      Years since quittin.6    Smokeless tobacco: Never   Substance and Sexual Activity    Alcohol use: Yes     Comment: rare    Drug use: No    Sexual activity: Defer       Allergies   Allergen Reactions    Penicillins Rash       Review of Systems     HEENT: Denies any hearing changes, eyesight changes, no headache or dizziness  NECK: Denies any pain, stiffness, swelling or dysphagia  CHEST: Denies cough or wheeze or recent lung infections  CARDIAC: Denies chest pain, pressure or palpitations  ABD: Denies nausea, vomiting or abdominal pain  : Denies dysuria  NEURO: Denies syncope, concussion, neuropathy  PSYCH: Denies any stress  EXTREM: Denies arthritic changes myalgia or arthralgia  SKIN: Original rash was mid low back and then spread to back abdomen arms and legs erythematous no particular pattern no oozing no infection.    Vital Signs  Vitals:    23 1031   BP: 100/70   BP Location: Left arm   Patient Position: Sitting   Cuff Size:  "Adult   Pulse: 60   Weight: 51.7 kg (114 lb)   Height: 162.6 cm (64\")   PainSc: 0-No pain     Body mass index is 19.57 kg/mý.  BMI is within normal parameters. No other follow-up for BMI required.     Advance Care Planning         Current Outpatient Medications:     BIOTIN PO, Take 1 tablet by mouth Daily., Disp: , Rfl:     CALCIUM-MAGNESIUM-ZINC PO, Take 1 tablet by mouth Daily., Disp: , Rfl:     Cholecalciferol (VITAMIN D-3) 5000 units tablet, Take 1 tablet by mouth Daily., Disp: , Rfl:     Multiple Vitamins-Minerals (MULTIVITAMIN ADULT PO), Take 1 tablet by mouth Daily., Disp: , Rfl:     Nutritional Supplements (NUTRITIONAL SUPPLEMENT PO), Take 1 capsule by mouth Daily. Quercitin, Disp: , Rfl:     Omega-3 Fatty Acids (FISH OIL) 1000 MG capsule capsule, Take 1 capsule by mouth Daily With Breakfast., Disp: , Rfl:     Probiotic Product (PROBIOTIC DAILY PO), Take 1 tablet by mouth Daily., Disp: , Rfl:     hydrOXYzine (ATARAX) 25 MG tablet, Take 1 tablet by mouth 3 (Three) Times a Day As Needed for Itching., Disp: 25 tablet, Rfl: 1    traZODone (DESYREL) 50 MG tablet, Take 1 tablet by mouth Every Night. (Patient not taking: Reported on 3/27/2023), Disp: 30 tablet, Rfl: 2    Physical Exam     ACE III MINI      HEENT: Pupils equal reactive ENT clear, no facial asymmetry, the pharynx is clear  NECK: No masses bruit or thyromegaly  CHEST: Clear without rales wheezes or rhonchi  CARDIAC: Regular rhythm without gallop rub or click, blood pressure heart rate stable  ABD: Liver spleen normal, good bowel sounds, nontender  : Deferred  NEURO: Intact  PSYCH: Normal  EXTREM: No significant arthritic changes, no edema  SKIN: Erythematous skin rash of low back with lesions on both arms and both legs no pattern no bruising no serous fluid can       Results Review:    No results found for this or any previous visit (from the past 672 hour(s)).  Procedures    Medication Review: Medications reviewed and noted    Patient wellness " counseling  Exercise: Continue active lifestyle ADL  Diet: Normal cardiac diet  Screening: None  Social History     Socioeconomic History    Marital status:    Tobacco Use    Smoking status: Former     Packs/day: 0.50     Years: 9.00     Pack years: 4.50     Types: Cigarettes     Quit date:      Years since quittin.6    Smokeless tobacco: Never   Substance and Sexual Activity    Alcohol use: Yes     Comment: rare    Drug use: No    Sexual activity: Defer        Assessment/Plan:    Problem List Items Addressed This Visit          Skin    Rash - Primary    Overview     Body rash for 48 hours started on back  Has been camping this past weekend in the trailer not on the ground and attempt           Current Assessment & Plan     Intolerance of prednisone causing anger agitation and prefers to not take if possible  Atarax 25 mg 3 times a day if not improved to notify us  Cool water bath             Patient Instructions   Atarax 25 mg 3 times a day  Cool water bathing  If not improved to please notify us and inform that develops anger agitation with prednisone in the past  Return to see Dr. Riddle as scheduled or as needed       1. Rash  - I will prescribe hydroxyzine 25 mg 3 times daily.  - I advised the patient to take the medication first thing when she gets up, last thing before going to bed, and then in between.  -      Plan of care reviewed with patient at the conclusion of today's visit. Education was provided regarding diagnosis, management, and any prescribed or recommended OTC medications.Patient verbalizes understanding of and agreement with management plan.         Cody Beard MD      Note: Part of this note may be an electronic transcription/translation of spoken language to printed text using the Dragon Dictation system.        Transcribed from ambient dictation for Cody Beard MD by Nurys Matos.  23   12:51 EDT    Patient or patient representative verbalized consent to the  visit recording.  I have personally performed the services described in this document as transcribed by the above individual, and it is both accurate and complete.

## 2023-08-14 NOTE — ASSESSMENT & PLAN NOTE
Intolerance of prednisone causing anger agitation and prefers to not take if possible  Atarax 25 mg 3 times a day if not improved to notify us  Cool water bath

## 2023-08-15 ENCOUNTER — TELEPHONE (OUTPATIENT)
Dept: INTERNAL MEDICINE | Facility: CLINIC | Age: 63
End: 2023-08-15

## 2023-08-15 RX ORDER — METHYLPREDNISOLONE 4 MG/1
TABLET ORAL
Qty: 5 TABLET | Refills: 1 | Status: SHIPPED | OUTPATIENT
Start: 2023-08-15

## 2023-08-15 RX ORDER — MOMETASONE FUROATE 1 MG/G
1 CREAM TOPICAL DAILY
Qty: 45 G | Refills: 0 | Status: SHIPPED | OUTPATIENT
Start: 2023-08-15

## 2023-08-15 NOTE — TELEPHONE ENCOUNTER
Caller: Walt Miriam Singleton    Relationship: Self    Best call back number: [phone]     What medication are you requesting: STEROID    What are your current symptoms: RASH        If a prescription is needed, what is your preferred pharmacy and phone number: [id number] [place], [place] - [address] AT [address] [facility] [phone] [facility] [phone] FX     Additional notes:PATIENT WAS IN YESTERDAY AND SEEN REYNA. THE MEDICATION   hydrOXYzine (ATARAX) 25 MG tablet PRESCRIBED MAKES HER VERY SLEEPY . WANTS TO KNOW IF HE CAN CALL IN THE STEROIDS HE HAD MENTIONED. PATIENT IS ALSO REQUESTING IF MAYBE SHE CAN HAVE A LOWER DOSE PER HER WEIGHT SO SHE MAY NOT HAVE AS MANY SIDE EFFECTS

## 2023-08-15 NOTE — TELEPHONE ENCOUNTER
Caller: Miriam Godoy    Relationship: Self    Best call back number:[phone]     What medication are you requesting: MOMETASONE FUROATE USP STERROID CREAM    What are your current symptoms: RASH     How long have you been experiencing symptoms: 2 DAYS    Have you had these symptoms before:    [] Yes  [x] No    Have you been treated for these symptoms before:   [] Yes  [x] No    If a prescription is needed, what is your preferred pharmacy and phone number: [facility] #[id number] [facility] [address] AT [facility] [address] [facility] [phone] [facility] [phone] FX

## 2023-08-15 NOTE — TELEPHONE ENCOUNTER
Caller: [facility] [id number] - [place], [place] - [address] - [phone] PH - [phone] FX    Relationship: Pharmacy      What was the call regarding: methylPREDNISolone (MEDROL) 2 MG tablet     THIS HAS BEEN DISCONTINUED, ONLY THE 4MG IS AVAILABLE.

## 2023-10-18 ENCOUNTER — OFFICE VISIT (OUTPATIENT)
Dept: INTERNAL MEDICINE | Facility: CLINIC | Age: 63
End: 2023-10-18
Payer: COMMERCIAL

## 2023-10-18 VITALS
BODY MASS INDEX: 19.5 KG/M2 | HEART RATE: 68 BPM | TEMPERATURE: 97.5 F | SYSTOLIC BLOOD PRESSURE: 100 MMHG | HEIGHT: 64 IN | WEIGHT: 114.2 LBS | DIASTOLIC BLOOD PRESSURE: 70 MMHG

## 2023-10-18 DIAGNOSIS — B34.9 ACUTE VIRAL SYNDROME: Primary | ICD-10-CM

## 2023-10-18 LAB
EXPIRATION DATE: NORMAL
EXPIRATION DATE: NORMAL
FLUAV AG UPPER RESP QL IA.RAPID: NOT DETECTED
FLUBV AG UPPER RESP QL IA.RAPID: NOT DETECTED
INTERNAL CONTROL: NORMAL
INTERNAL CONTROL: NORMAL
Lab: NORMAL
Lab: NORMAL
RSV AG SPEC QL: NEGATIVE
SARS-COV-2 AG UPPER RESP QL IA.RAPID: NOT DETECTED

## 2023-10-18 PROCEDURE — 87428 SARSCOV & INF VIR A&B AG IA: CPT | Performed by: STUDENT IN AN ORGANIZED HEALTH CARE EDUCATION/TRAINING PROGRAM

## 2023-10-18 PROCEDURE — 87807 RSV ASSAY W/OPTIC: CPT | Performed by: STUDENT IN AN ORGANIZED HEALTH CARE EDUCATION/TRAINING PROGRAM

## 2023-10-18 PROCEDURE — 99213 OFFICE O/P EST LOW 20 MIN: CPT | Performed by: STUDENT IN AN ORGANIZED HEALTH CARE EDUCATION/TRAINING PROGRAM

## 2023-10-18 NOTE — PROGRESS NOTES
"Chief Complaint  Miriam Godoy is a 62 y.o. female presenting for URI.     Patient has a past medical history of PVCs, allergic tinnitus, hordeolum and lower back pain.     History of Present Illness  Patient is here for acute visit due to symptoms that started Friday night 10/13/2023 with chills and shivers, headache, body aches, nasal congestion, diffuse sinus pressure and drainage, yellow on occasion.  Also some cough without phlegm, no shortness of breath, but gets mildly winded when walking up 2 flights of stairs.  No sore throat.  Some pressure of her ears.  Decreased taste.  She has done 2 COVID tests at home that were negative.  She will be traveling to [place] at the end of the week with her elderly mother and was concerned if this was something that needed to be treated.  She has been taking herbal supplement for symptom relief.    The following portions of the patient's history were reviewed and updated as appropriate: allergies, current medications, past family history, past medical history, past social history, past surgical history, and problem list.    Objective  /70 (BP Location: Left arm, Patient Position: Sitting, Cuff Size: Adult)   Pulse 68   Temp 97.5 °F (36.4 °C) (Temporal)   Ht 162.6 cm (64.02\")   Wt 51.8 kg (114 lb 3.2 oz)   BMI 19.59 kg/m²     Physical Exam  Vitals reviewed.   Constitutional:       Appearance: Normal appearance.   HENT:      Head: Normocephalic and atraumatic.      Right Ear: Tympanic membrane, ear canal and external ear normal. There is no impacted cerumen.      Left Ear: Tympanic membrane, ear canal and external ear normal. There is no impacted cerumen.      Nose: Congestion present.      Mouth/Throat:      Mouth: Mucous membranes are moist.      Pharynx: Oropharynx is clear.   Eyes:      Extraocular Movements: Extraocular movements intact.      Conjunctiva/sclera: Conjunctivae normal.   Cardiovascular:      Rate and Rhythm: Normal rate and regular rhythm.     "  Heart sounds: Normal heart sounds. No murmur heard.  Pulmonary:      Effort: Pulmonary effort is normal. No respiratory distress.      Breath sounds: Normal breath sounds. No wheezing.   Musculoskeletal:      Cervical back: Neck supple. No tenderness.   Lymphadenopathy:      Cervical: No cervical adenopathy.   Skin:     General: Skin is warm and dry.   Neurological:      Mental Status: She is alert and oriented to person, place, and time. Mental status is at baseline.   Psychiatric:         Behavior: Behavior normal.         Thought Content: Thought content normal.         Assessment/Plan   1. Acute viral syndrome  Negative COVID, RSV and flu.  Likely viral illness.  She reports some improvement in the last couple of days.  Recommend Flonase for a few weeks, Mucinex for symptom relief, Tylenol or ibuprofen for headache/body aches.  Offered cough syrup, patient does not feel the need.  If she would start to get worse over the next 1-2 weeks, with fever, worsening facial pressure, worsening cough, recommend she get back in touch.  At that point I would consider antibiotics.  - POCT SARS-CoV-2 + Flu Antigen VICENTA  - POCT RSV      Return if symptoms worsen or fail to improve, for Next scheduled follow up.    Future Appointments         Provider Department Center    3/22/2024 9:15 AM Bishnu Riddle MD [facility]            Ernst Whitehead MD  Family Medicine  10/18/2023

## 2023-10-20 ENCOUNTER — TELEPHONE (OUTPATIENT)
Dept: INTERNAL MEDICINE | Facility: CLINIC | Age: 63
End: 2023-10-20
Payer: COMMERCIAL

## 2023-10-20 ENCOUNTER — OFFICE VISIT (OUTPATIENT)
Dept: INTERNAL MEDICINE | Facility: CLINIC | Age: 63
End: 2023-10-20
Payer: COMMERCIAL

## 2023-10-20 VITALS
DIASTOLIC BLOOD PRESSURE: 70 MMHG | TEMPERATURE: 97.8 F | SYSTOLIC BLOOD PRESSURE: 102 MMHG | HEIGHT: 64 IN | WEIGHT: 115 LBS | HEART RATE: 72 BPM | BODY MASS INDEX: 19.63 KG/M2

## 2023-10-20 DIAGNOSIS — J01.00 ACUTE NON-RECURRENT MAXILLARY SINUSITIS: Primary | ICD-10-CM

## 2023-10-20 PROCEDURE — 99213 OFFICE O/P EST LOW 20 MIN: CPT

## 2023-10-20 RX ORDER — DOXYCYCLINE HYCLATE 100 MG/1
100 CAPSULE ORAL 2 TIMES DAILY
Qty: 14 CAPSULE | Refills: 0 | Status: SHIPPED | OUTPATIENT
Start: 2023-10-20 | End: 2023-10-27

## 2023-10-20 NOTE — PROGRESS NOTES
Acute Office Visit      Name: Miriam Godoy    : 1960     MRN: [id number]   Care Team: Patient Care Team:  Bishnu Riddle MD as PCP - General (Internal Medicine)    Chief Complaint  Night Sweats and Headache    Subjective     History of Present Illness:  The patient is a 62-year-old female who presents today for an acute office visit.    On Friday 10/13/2023, she became sick with chills and a cough. She was seen a couple of days ago who said to monitor her condition because it could produce a secondary infection. She is unsure of the provider's name. She describes a productive cough with green sputum. The last 3 nights she sweated through her pajamas and had a severe splitting headache. Those symptoms are unusual for her. She has not had a fever. She denies any headache currently. It only happens during the night when she gets chills. Her cough worsens at night as well. She has tried Flonase with mild relief. She has had only one dose of Mucinex. She denies chest pain, shortness of breath, nausea, vomiting, sore throat, or diarrhea. When her symptoms first began, she had bilateral otalgia but none currently. She denies having a decreased appetite.       Review of Systems:  Positive for productive cough with green sputum.  Positive for chills.  Positive for congestion.  Positive for green rhinorrhea.  Positive for excessive sweating.  Positive for headaches.  Past Medical History:   Diagnosis Date    Aortic root dilatation     Atypical chest pain     History of echocardiogram 2015    showing estimated EF 55% to 60% with mild aortic regurgitation and moderate dilatation of the aortic root    History of EKG     normal    History of palpitations     Osteopenia        Past Surgical History:   Procedure Laterality Date    APPENDECTOMY      OOPHORECTOMY Bilateral        Social History     Socioeconomic History    Marital status:    Tobacco Use    Smoking status: Former     Packs/day: 0.50  "    Years: 9.00     Additional pack years: 0.00     Total pack years: 4.50     Types: Cigarettes     Quit date: 1981     Years since quittin.8    Smokeless tobacco: Never   Vaping Use    Vaping Use: Never used   Substance and Sexual Activity    Alcohol use: Not Currently     Comment: rare    Drug use: No    Sexual activity: Yes     Partners: Male     Birth control/protection: Post-menopausal         Current Outpatient Medications:     BIOTIN PO, Take 1 tablet by mouth Daily., Disp: , Rfl:     CALCIUM-MAGNESIUM-ZINC PO, Take 1 tablet by mouth Daily., Disp: , Rfl:     Cholecalciferol (VITAMIN D-3) 5000 units tablet, Take 1 tablet by mouth Daily., Disp: , Rfl:     hydrOXYzine (ATARAX) 25 MG tablet, Take 1 tablet by mouth 3 (Three) Times a Day As Needed for Itching., Disp: 25 tablet, Rfl: 1    mometasone (Elocon) 0.1 % cream, Apply 1 application  topically to the appropriate area as directed Daily., Disp: 45 g, Rfl: 0    Multiple Vitamins-Minerals (MULTIVITAMIN ADULT PO), Take 1 tablet by mouth Daily., Disp: , Rfl:     Nutritional Supplements (NUTRITIONAL SUPPLEMENT PO), Take 1 capsule by mouth Daily. Quercitin, Disp: , Rfl:     Omega-3 Fatty Acids (FISH OIL) 1000 MG capsule capsule, Take 1 capsule by mouth Daily With Breakfast., Disp: , Rfl:     Probiotic Product (PROBIOTIC DAILY PO), Take 1 tablet by mouth Daily., Disp: , Rfl:     traZODone (DESYREL) 50 MG tablet, Take 1 tablet by mouth Every Night., Disp: 30 tablet, Rfl: 2    doxycycline (VIBRAMYCIN) 100 MG capsule, Take 1 capsule by mouth 2 (Two) Times a Day for 7 days., Disp: 14 capsule, Rfl: 0    methylPREDNISolone (Medrol) 4 MG tablet, Take 1/2 tablet twice daily for 5 days, Disp: 5 tablet, Rfl: 1    Procedures    PHQ-9 Total Score:      Objective     Vital Signs  /70   Pulse 72   Temp 97.8 °F (36.6 °C)   Ht 162.6 cm (64.02\")   Wt 52.2 kg (115 lb)   BMI 19.73 kg/m²   Estimated body mass index is 19.73 kg/m² as calculated from the following:    " "Height as of this encounter: 162.6 cm (64.02\").    Weight as of this encounter: 52.2 kg (115 lb).    BMI is within normal parameters. No other follow-up for BMI required.      Physical Exam  Vitals and nursing note reviewed.   HENT:      Head: Normocephalic.      Right Ear: Hearing, tympanic membrane, ear canal and external ear normal.      Left Ear: Hearing, tympanic membrane, ear canal and external ear normal.      Nose: Congestion present.      Mouth/Throat:      Pharynx: Posterior oropharyngeal erythema present.   Cardiovascular:      Rate and Rhythm: Normal rate.   Pulmonary:      Effort: Pulmonary effort is normal.      Breath sounds: Normal breath sounds.   Lymphadenopathy:      Cervical: No cervical adenopathy.   Skin:     General: Skin is warm and dry.   Neurological:      General: No focal deficit present.      Mental Status: She is alert and oriented to person, place, and time.   Psychiatric:         Mood and Affect: Mood normal.         Behavior: Behavior normal.         Thought Content: Thought content normal.         Judgment: Judgment normal.            Assessment and Plan     Assessment/Plan:  Diagnoses and all orders for this visit:    1. Acute non-recurrent maxillary sinusitis (Primary)  -     doxycycline (VIBRAMYCIN) 100 MG capsule; Take 1 capsule by mouth 2 (Two) Times a Day for 7 days.  Dispense: 14 capsule; Refill: 0  - I am going to prescribe her doxycycline 100 mg twice per day for 7 days.   - I advised her from discontinuing any nutritional supplements until she has completed the antibiotics.   - I recommended her to continue using Flonase.       There are no Patient Instructions on file for this visit.  Plan of care reviewed with patient at the conclusion of today's visit. Education was provided regarding diagnosis, management and any prescribed or recommended OTC medications.  Patient verbalizes understanding of and agreement with management plan.    Follow Up  Return for Next Scheduled " Follow up.    JUVENTINO Bose     Transcribed from ambient dictation for JUVENTINO Bose by Deepika Perez.  10/20/23   19:25 EDT    Patient or patient representative verbalized consent to the visit recording.  I have personally performed the services described in this document as transcribed by the above individual, and it is both accurate and complete.

## 2023-11-02 ENCOUNTER — READMISSION MANAGEMENT (OUTPATIENT)
Dept: CALL CENTER | Facility: HOSPITAL | Age: 63
End: 2023-11-02
Payer: COMMERCIAL

## 2023-11-02 NOTE — OUTREACH NOTE
Prep Survey      Flowsheet Row Responses   Religious facility patient discharged from? Non-BH   Is LACE score < 7 ? Non-BH Discharge   Eligibility [facility]   Date of Admission 10/31/23   Date of Discharge 11/01/23   Discharge Disposition Home or Self Care   Discharge diagnosis Generalized abdominal pain   Does the patient have one of the following disease processes/diagnoses(primary or secondary)? Other   Prep survey completed? Yes            Ban JOHNSON - Registered Nurse

## 2023-11-03 ENCOUNTER — TRANSITIONAL CARE MANAGEMENT TELEPHONE ENCOUNTER (OUTPATIENT)
Dept: CALL CENTER | Facility: HOSPITAL | Age: 63
End: 2023-11-03
Payer: COMMERCIAL

## 2023-11-03 NOTE — OUTREACH NOTE
Call Center TCM Note      Flowsheet Row Responses   [facility] patient discharged from? Non-BH  [U/K]   Does the patient have one of the following disease processes/diagnoses(primary or secondary)? Other   TCM attempt successful? No  [verbal release ]   Unsuccessful attempts Attempt 1   Call Status Left message            Christal Velazquez RN    11/3/2023, 09:41 EDT

## 2023-11-03 NOTE — OUTREACH NOTE
Call Center TCM Note      Flowsheet Row Responses   [facility] facility patient discharged from? Non-  []   Does the patient have one of the following disease processes/diagnoses(primary or secondary)? Other   TCM attempt successful? No  [verbal release ]   Unsuccessful attempts Attempt 2            Christal Velazquez RN    11/3/2023, 13:52 EDT

## 2023-11-04 ENCOUNTER — TRANSITIONAL CARE MANAGEMENT TELEPHONE ENCOUNTER (OUTPATIENT)
Dept: CALL CENTER | Facility: HOSPITAL | Age: 63
End: 2023-11-04
Payer: COMMERCIAL

## 2023-11-04 NOTE — OUTREACH NOTE
Call Center TCM Note      Flowsheet Row Responses   [facility] patient discharged from? Non-BH   Does the patient have one of the following disease processes/diagnoses(primary or secondary)? Other   TCM attempt successful? No   Unsuccessful attempts Attempt 3            Eneida Victor RN    11/4/2023, 10:35 EDT

## 2023-11-27 ENCOUNTER — LAB (OUTPATIENT)
Dept: LAB | Facility: HOSPITAL | Age: 63
End: 2023-11-27
Payer: MEDICARE

## 2023-11-27 ENCOUNTER — TRANSCRIBE ORDERS (OUTPATIENT)
Dept: LAB | Facility: HOSPITAL | Age: 63
End: 2023-11-27
Payer: MEDICARE

## 2023-11-27 DIAGNOSIS — Z01.419 ROUTINE GYNECOLOGICAL EXAMINATION: Primary | ICD-10-CM

## 2023-11-27 DIAGNOSIS — Z01.419 ROUTINE GYNECOLOGICAL EXAMINATION: ICD-10-CM

## 2023-11-27 LAB
ESTRADIOL SERPL HS-MCNC: 8.8 PG/ML
FSH SERPL-ACNC: 69.3 MIU/ML
LH SERPL-ACNC: 36 MIU/ML
PROGEST SERPL-MCNC: 0.13 NG/ML
TESTOST SERPL-MCNC: <2.5 NG/DL (ref 2.9–40.8)
TSH SERPL DL<=0.05 MIU/L-ACNC: 1.7 UIU/ML (ref 0.27–4.2)

## 2023-11-27 PROCEDURE — 84144 ASSAY OF PROGESTERONE: CPT

## 2023-11-27 PROCEDURE — 83001 ASSAY OF GONADOTROPIN (FSH): CPT

## 2023-11-27 PROCEDURE — 82670 ASSAY OF TOTAL ESTRADIOL: CPT

## 2023-11-27 PROCEDURE — 84443 ASSAY THYROID STIM HORMONE: CPT

## 2023-11-27 PROCEDURE — 83002 ASSAY OF GONADOTROPIN (LH): CPT

## 2023-11-27 PROCEDURE — 36415 COLL VENOUS BLD VENIPUNCTURE: CPT

## 2023-11-27 PROCEDURE — 84403 ASSAY OF TOTAL TESTOSTERONE: CPT

## 2023-12-19 ENCOUNTER — TRANSCRIBE ORDERS (OUTPATIENT)
Dept: ADMINISTRATIVE | Facility: HOSPITAL | Age: 63
End: 2023-12-19
Payer: MEDICARE

## 2023-12-19 DIAGNOSIS — Z12.31 VISIT FOR SCREENING MAMMOGRAM: Primary | ICD-10-CM

## 2023-12-22 ENCOUNTER — OFFICE VISIT (OUTPATIENT)
Dept: INTERNAL MEDICINE | Facility: CLINIC | Age: 63
End: 2023-12-22
Payer: MEDICARE

## 2023-12-22 ENCOUNTER — TELEPHONE (OUTPATIENT)
Dept: INTERNAL MEDICINE | Facility: CLINIC | Age: 63
End: 2023-12-22
Payer: MEDICARE

## 2023-12-22 VITALS
HEART RATE: 68 BPM | TEMPERATURE: 98.5 F | DIASTOLIC BLOOD PRESSURE: 58 MMHG | BODY MASS INDEX: 20.01 KG/M2 | HEIGHT: 64 IN | WEIGHT: 117.2 LBS | SYSTOLIC BLOOD PRESSURE: 100 MMHG

## 2023-12-22 DIAGNOSIS — R09.81 NASAL CONGESTION: ICD-10-CM

## 2023-12-22 DIAGNOSIS — U07.1 COVID-19 VIRUS INFECTION: Primary | ICD-10-CM

## 2023-12-22 PROBLEM — R10.31 RIGHT LOWER QUADRANT PAIN: Status: ACTIVE | Noted: 2023-12-22

## 2023-12-22 LAB
EXPIRATION DATE: ABNORMAL
FLUAV AG UPPER RESP QL IA.RAPID: NOT DETECTED
FLUBV AG UPPER RESP QL IA.RAPID: NOT DETECTED
INTERNAL CONTROL: ABNORMAL
Lab: ABNORMAL
SARS-COV-2 AG UPPER RESP QL IA.RAPID: DETECTED

## 2023-12-22 NOTE — TELEPHONE ENCOUNTER
PATIENT HAS CALLED AND STATED SHE HAS RECENTLY BEEN SEEN BY CODY STRICKLAND AND IS REQUESTING IF SHE CAN HAVE HER PCP SWITCHED TO CODY STRICKLAND. PATIENT IS REQUESTING A CALL BACK EITHER WAY TO LET HER KNOW OF THE DECISION.    CALL BACK NUMBER [phone]

## 2023-12-22 NOTE — PROGRESS NOTES
[facility]    Miriam Godoy  1960   [id number]      Patient Care Team:  Bishnu Riddle MD as PCP - General (Internal Medicine)    Chief Complaint::   Chief Complaint   Patient presents with    Generalized Body Aches     yesterday    Abdominal Pain     yesterday    Nasal Congestion     yesterday    inflammed bowel     Er in oct 31.started hurtimg yesterday. Had bowel movement yesterday.        HPI  Miriam is a 63 year old female who presents with achiness, myalgias, watery eyes, sinus congestion.    She tested positive for COVID today.  She had inflammation of her right colon, confirmed by imaging in October.  She has taken Tylenol arthritis, with significant improvement.  She denies fever.  She denies constipation or diarrhea.  She denies blood in her stool.  She denies hematuria or dysuria.    Patient Active Problem List   Diagnosis    Premature ventricular contractions (PVCs) (VPCs)    Rash    Chronic allergic rhinitis    Internal nasal lesion    Hordeolum internum    Annual physical exam    Acute left-sided low back pain without sciatica    Mixed hyperlipidemia    COVID-19 virus infection    Right lower quadrant pain        Past Medical History:   Diagnosis Date    Aortic root dilatation     Atypical chest pain     History of echocardiogram 04/16/2015    showing estimated EF 55% to 60% with mild aortic regurgitation and moderate dilatation of the aortic root    History of EKG     normal    History of palpitations     Osteopenia        Past Surgical History:   Procedure Laterality Date    APPENDECTOMY      OOPHORECTOMY Bilateral        Family History   Problem Relation Age of Onset    Breast cancer Mother 43    Breast cancer Paternal Grandmother 47    Coronary artery disease Father 57        premature    Ovarian cancer Neg Hx        Social History     Socioeconomic History    Marital status:    Tobacco Use    Smoking status: Former     Packs/day: 0.50     Years: 9.00      "Additional pack years: 0.00     Total pack years: 4.50     Types: Cigarettes     Quit date: 1981     Years since quittin.0    Smokeless tobacco: Never   Vaping Use    Vaping Use: Never used   Substance and Sexual Activity    Alcohol use: Not Currently     Comment: rare    Drug use: No    Sexual activity: Yes     Partners: Male     Birth control/protection: Post-menopausal       Allergies   Allergen Reactions    Penicillins Rash       Review of Systems   Constitutional:  Negative for activity change, appetite change, diaphoresis, fatigue, unexpected weight gain and unexpected weight loss.   HENT:  Positive for congestion, sinus pressure, sore throat and swollen glands. Negative for hearing loss.    Eyes:  Negative for visual disturbance.   Respiratory:  Negative for chest tightness and shortness of breath.    Cardiovascular:  Negative for chest pain, palpitations and leg swelling.   Gastrointestinal:  Positive for abdominal pain. Negative for blood in stool, GERD and indigestion.   Endocrine: Negative for cold intolerance and heat intolerance.   Genitourinary:  Negative for dysuria and hematuria.   Musculoskeletal:  Negative for arthralgias and myalgias.   Skin:  Negative for skin lesions.   Neurological:  Negative for tremors, seizures, syncope, speech difficulty, weakness, headache, memory problem and confusion.   Hematological:  Does not bruise/bleed easily.   Psychiatric/Behavioral:  Negative for sleep disturbance and depressed mood. The patient is not nervous/anxious.         Vital Signs  Vitals:    23 1042   BP: 100/58   BP Location: Left arm   Patient Position: Sitting   Cuff Size: Adult   Pulse: 68   Temp: 98.5 °F (36.9 °C)   TempSrc: Infrared   Weight: 53.2 kg (117 lb 3.2 oz)   Height: 162.6 cm (64.02\")   PainSc: 5  Comment: last night   PainLoc: Abdomen     Body mass index is 20.11 kg/m².  BMI is within normal parameters. No other follow-up for BMI required.     Advance Care Planning   ACP " discussion was held with the patient during this visit. Patient does not have an advance directive, information provided.       Current Outpatient Medications:     BIOTIN PO, Take 1 tablet by mouth Daily., Disp: , Rfl:     Cholecalciferol (VITAMIN D-3) 5000 units tablet, Take 1 tablet by mouth Daily., Disp: , Rfl:     Multiple Vitamins-Minerals (MULTIVITAMIN ADULT PO), Take 1 tablet by mouth Daily., Disp: , Rfl:     Omega-3 Fatty Acids (FISH OIL) 1000 MG capsule capsule, Take 1 capsule by mouth Daily With Breakfast., Disp: , Rfl:     Probiotic Product (PROBIOTIC DAILY PO), Take 1 tablet by mouth Daily., Disp: , Rfl:     Nirmatrelvir & Ritonavir, 300mg/100mg, (PAXLOVID) 20 x 150 MG & 10 x 100MG tablet therapy pack tablet, Take 3 tablets by mouth 2 (Two) Times a Day., Disp: 30 each, Rfl: 1    Physical Exam  Vitals and nursing note reviewed.   Constitutional:       General: She is not in acute distress.     Appearance: She is well-developed.   HENT:      Head: Normocephalic.      Right Ear: Tympanic membrane and ear canal normal. Tympanic membrane is not erythematous.      Left Ear: Tympanic membrane and ear canal normal. Tympanic membrane is not erythematous.      Nose: Nose normal.      Right Sinus: No maxillary sinus tenderness or frontal sinus tenderness.      Left Sinus: No maxillary sinus tenderness or frontal sinus tenderness.      Mouth/Throat:      Pharynx: No posterior oropharyngeal erythema.      Tonsils: No tonsillar exudate.   Eyes:      General:         Right eye: No discharge.         Left eye: No discharge.      Conjunctiva/sclera: Conjunctivae normal.   Cardiovascular:      Rate and Rhythm: Normal rate and regular rhythm.      Heart sounds: Normal heart sounds.   Pulmonary:      Effort: Pulmonary effort is normal. No respiratory distress.      Breath sounds: Normal breath sounds. No wheezing, rhonchi or rales.   Abdominal:      Tenderness: There is abdominal tenderness in the right lower quadrant.        Musculoskeletal:      Cervical back: Normal range of motion and neck supple.   Lymphadenopathy:      Cervical: No cervical adenopathy.      Upper Body:      Right upper body: No supraclavicular adenopathy.      Left upper body: No supraclavicular adenopathy.   Skin:     General: Skin is warm and dry.   Psychiatric:         Mood and Affect: Mood normal.         Behavior: Behavior normal.          ACE III MINI            Results Review:    Recent Results (from the past 672 hour(s))   FSH & LH    Collection Time: 11/27/23 12:00 PM    Specimen: Blood   Result Value Ref Range    FSH 69.30 mIU/mL    LH 36.00 mIU/mL   TSH Rfx On Abnormal To Free T4    Collection Time: 11/27/23 12:00 PM    Specimen: Blood   Result Value Ref Range    TSH 1.700 0.270 - 4.200 uIU/mL   Estradiol    Collection Time: 11/27/23 12:00 PM    Specimen: Blood   Result Value Ref Range    Estradiol 8.8 pg/mL   Progesterone    Collection Time: 11/27/23 12:00 PM    Specimen: Blood   Result Value Ref Range    Progesterone 0.13 ng/mL   Testosterone    Collection Time: 11/27/23 12:00 PM    Specimen: Blood   Result Value Ref Range    Testosterone, Total <2.50 (L) 2.90 - 40.80 ng/dL   Surgical Pathology Exam    Collection Time: 12/15/23 10:58 AM   Result Value Ref Range    Case Report       Surgical Pathology                                Case: [id number]                                   Authorizing Provider:  Kian Marino MD  Collected:           12/15/2023 1058              Ordering Location:     [facility]            Received:            12/15/2023 1243              Pathologist:           Chace Garcia MD                                                        Specimens:   A) - Duodenum, duodenal biopsy                                                                      B) - Stomach, gastric biopsy                                                                        C) - Stomach, gastric polyp                                                               Clinician provided ICD9:       A. DUODENUM, BIOPSY:    - CHRONIC INFLAMMATION WITH GASTRIC SURFACE CELL METAPLASIA; NONSPECIFIC CHRONIC DUODENITIS     B. STOMACH, BIOPSY:    - REACTIVE GASTROPATHIC CHANGES    - NO EVIDENCE OF HELICOBACTER-LIKE ORGANISMS ON ROUTINE STAIN    C. STOMACH, POLYP, BIOPSY:     - FUNDIC GLAND POLYP      Clinical Information       R93.3 - Abnormal digestive system diagnostic imaging [ICD-10-CM]  R10.13 - Dyspepsia [ICD-10-CM]  Non-obstructing Schatzki ring in the GE junction  Esophagus was otherwise normal.  Mild, localized erythematous mucosa with erosion in the body of the stomach; no bleeding was identified; performed cold forceps biopsy  Three sessile polyps measuring smaller than 5 mm in the stomach; performed cold forceps biopsy  The duodenal bulb, 1st part of the duodenum and 2nd part of the duodenum appeared normal. Performed random biopsy using biopsy forceps.      Gross description:       A. DUODENAL BIOPSY   The specimen is received in formalin labeled “duodenal biopsy”.  Consists of multiple fragments of tan-brown mucosa, ranging from 0.2-0.4 cm, 0.6 x 0.4 x 0.2 cm in aggregate. The specimen is submitted entirely in cassette A1  [employer]    B. GASTRIC BIOPSY   The specimen is received in formalin labeled “gastric biopsy”.  It consists of 3 fragments of tan brown mucosa, measuring 0.2, 0.3 and 0.4 cm. The specimen is submitted entirely in cassette B1.  [employer]    C. GASTRIC POLYP   The specimen is received in formalin labeled “gastric polyp”. Consists of a 0.3 x 0.2 x 0.2 cm portions of tan-brown mucosa.  The specimen is submitted entirely in cassette C1.  [employer]       POCT SARS-CoV-2 + Flu Antigen VICENTA    Collection Time: 12/22/23 11:23 AM    Specimen: Swab   Result Value Ref Range    SARS Antigen Detected (A) Not Detected, Presumptive Negative    Influenza A Antigen VICENTA Not Detected Not Detected    Influenza B Antigen VICENTA Not  Detected Not Detected    Internal Control Passed Passed    Lot Number [id number]     Expiration Date 11/3/2024      Procedures    Medication Review: Medications reviewed and noted    Social History     Socioeconomic History    Marital status:    Tobacco Use    Smoking status: Former     Packs/day: 0.50     Years: 9.00     Additional pack years: 0.00     Total pack years: 4.50     Types: Cigarettes     Quit date: 1981     Years since quittin.0    Smokeless tobacco: Never   Vaping Use    Vaping Use: Never used   Substance and Sexual Activity    Alcohol use: Not Currently     Comment: rare    Drug use: No    Sexual activity: Yes     Partners: Male     Birth control/protection: Post-menopausal        Assessment/Plan:    Diagnoses and all orders for this visit:    1. COVID-19 virus infection (Primary)  Overview:  Will start Paxlovid.  Tylenol for muscle aches.    Orders:  -     Nirmatrelvir & Ritonavir, 300mg/100mg, (PAXLOVID) 20 x 150 MG & 10 x 100MG tablet therapy pack tablet; Take 3 tablets by mouth 2 (Two) Times a Day.  Dispense: 30 each; Refill: 1    2. Nasal congestion  -     POCT SARS-CoV-2 + Flu Antigen VICENTA         There are no Patient Instructions on file for this visit.     Plan of care reviewed with patient at the conclusion of today's visit. Education was provided regarding diagnosis, management, and any prescribed or recommended OTC medications.Patient verbalizes understanding of and agreement with management plan.         I spent 30 minutes caring for Miriam on this date of service. This time includes time spent by me in the following activities:preparing for the visit, reviewing tests, obtaining and/or reviewing a separately obtained history, performing a medically appropriate examination and/or evaluation , counseling and educating the patient/family/caregiver, ordering medications, tests, or procedures, referring and communicating with other health care professionals , and documenting  information in the medical record    Agueda Scott PA-C      Note: Part of this note may be an electronic transcription/translation of spoken language to printed text using the Dragon Dictation system.

## 2023-12-28 NOTE — TELEPHONE ENCOUNTER
I informed patient that her message has been sent to Dr. Riddle but he is out of the office until 1/8/24 and will address it then. Patient verbalized understanding.

## 2024-01-05 ENCOUNTER — OFFICE VISIT (OUTPATIENT)
Dept: INTERNAL MEDICINE | Facility: CLINIC | Age: 64
End: 2024-01-05
Payer: MEDICARE

## 2024-01-05 VITALS
DIASTOLIC BLOOD PRESSURE: 54 MMHG | SYSTOLIC BLOOD PRESSURE: 90 MMHG | BODY MASS INDEX: 20.14 KG/M2 | HEIGHT: 64 IN | HEART RATE: 56 BPM | WEIGHT: 118 LBS | TEMPERATURE: 97.8 F

## 2024-01-05 DIAGNOSIS — J01.00 ACUTE NON-RECURRENT MAXILLARY SINUSITIS: Primary | ICD-10-CM

## 2024-01-05 PROCEDURE — 1159F MED LIST DOCD IN RCRD: CPT

## 2024-01-05 PROCEDURE — 1160F RVW MEDS BY RX/DR IN RCRD: CPT

## 2024-01-05 PROCEDURE — 99213 OFFICE O/P EST LOW 20 MIN: CPT

## 2024-01-05 RX ORDER — FLUTICASONE PROPIONATE 50 MCG
2 SPRAY, SUSPENSION (ML) NASAL DAILY
Qty: 18.2 ML | Refills: 1 | Status: SHIPPED | OUTPATIENT
Start: 2024-01-05

## 2024-01-05 RX ORDER — AZITHROMYCIN 250 MG/1
TABLET, FILM COATED ORAL
Qty: 6 TABLET | Refills: 0 | Status: SHIPPED | OUTPATIENT
Start: 2024-01-05

## 2024-01-05 NOTE — PROGRESS NOTES
Acute Office Visit      Name: Mirima Godoy    : 1960     MRN: [id number]   Care Team: Patient Care Team:  Bishnu Riddle MD as PCP - General (Internal Medicine)    Chief Complaint  URI (Nasal congestion, chest congestion, cough, and ear ache x 2 days )    Subjective     History of Present Illness:  The patient is a 63-year-old female who presents for evaluation of congestion.    She contracted COVID-19 on Dec  and tested negative since then.     She was doing better; however, for the last couple of days, she has had congestion with green-yellow mucus coming out of her chest and head.     She has been taking Sudafed, which helps a little bit.     She has intermittent pressure in her right ear. She denies any headaches. She denies any pressure in her face, forehead, behind her eyes, or cheeks. She has had severe sinus infections in the past. She denies any shortness of breath or chest pain. She denies any wheezing or gurgling when she breathes.     She denies any sick contacts. Her appetite is good. She has been using Flonase nasal spray; however, she does not think she is getting much out of it. She has used Z-Tc in the past, which worked well for her.    She is allergic to PENICILLIN.      Review of Systems:    Past Medical History:   Diagnosis Date    Annual physical exam 04/15/2019    Aortic root dilatation     Atypical chest pain     History of echocardiogram 2015    showing estimated EF 55% to 60% with mild aortic regurgitation and moderate dilatation of the aortic root    History of EKG     normal    History of palpitations     Osteopenia        Past Surgical History:   Procedure Laterality Date    APPENDECTOMY      OOPHORECTOMY Bilateral        Social History     Socioeconomic History    Marital status:    Tobacco Use    Smoking status: Former     Packs/day: 0.50     Years: 9.00     Additional pack years: 0.00     Total pack years: 4.50     Types: Cigarettes     Quit  "date: 1981     Years since quittin.0    Smokeless tobacco: Never   Vaping Use    Vaping Use: Never used   Substance and Sexual Activity    Alcohol use: Not Currently     Comment: rare    Drug use: No    Sexual activity: Yes     Partners: Male     Birth control/protection: Post-menopausal         Current Outpatient Medications:     BIOTIN PO, Take 1 tablet by mouth Daily., Disp: , Rfl:     Cholecalciferol (VITAMIN D-3) 5000 units tablet, Take 1 tablet by mouth Daily., Disp: , Rfl:     Multiple Vitamins-Minerals (MULTIVITAMIN ADULT PO), Take 1 tablet by mouth Daily., Disp: , Rfl:     Omega-3 Fatty Acids (FISH OIL) 1000 MG capsule capsule, Take 1 capsule by mouth Daily With Breakfast., Disp: , Rfl:     Probiotic Product (PROBIOTIC DAILY PO), Take 1 tablet by mouth Daily., Disp: , Rfl:     azithromycin (Zithromax Z-Tc) 250 MG tablet, Take 2 tablets by mouth on day 1, then 1 tablet daily on days 2-5, Disp: 6 tablet, Rfl: 0    fluticasone (FLONASE) 50 MCG/ACT nasal spray, 2 sprays into the nostril(s) as directed by provider Daily., Disp: 18.2 mL, Rfl: 1    Procedures    PHQ-9 Total Score:      Objective     Vital Signs  BP 90/54 (BP Location: Left arm, Patient Position: Sitting, Cuff Size: Adult)   Pulse 56   Temp 97.8 °F (36.6 °C) (Temporal)   Ht 162.6 cm (64.02\")   Wt 53.5 kg (118 lb)   BMI 20.24 kg/m²   Estimated body mass index is 20.24 kg/m² as calculated from the following:    Height as of this encounter: 162.6 cm (64.02\").    Weight as of this encounter: 53.5 kg (118 lb).    BMI is within normal parameters. No other follow-up for BMI required.      Physical Exam  Vitals and nursing note reviewed.   HENT:      Head: Normocephalic.      Right Ear: Tympanic membrane, ear canal and external ear normal.      Left Ear: Tympanic membrane, ear canal and external ear normal.      Nose: Congestion present.      Mouth/Throat:      Mouth: Mucous membranes are moist.      Pharynx: Posterior oropharyngeal erythema " present.   Eyes:      Pupils: Pupils are equal, round, and reactive to light.   Cardiovascular:      Rate and Rhythm: Normal rate.   Pulmonary:      Effort: Pulmonary effort is normal.      Breath sounds: Normal breath sounds.   Skin:     General: Skin is warm and dry.   Neurological:      General: No focal deficit present.      Mental Status: She is alert and oriented to person, place, and time.   Psychiatric:         Mood and Affect: Mood normal.         Behavior: Behavior normal.         Thought Content: Thought content normal.         Judgment: Judgment normal.            @[facility]@    Assessment and Plan     Assessment/Plan:  Diagnoses and all orders for this visit:    1. Acute non-recurrent maxillary sinusitis (Primary)  -     fluticasone (FLONASE) 50 MCG/ACT nasal spray; 2 sprays into the nostril(s) as directed by provider Daily.  Dispense: 18.2 mL; Refill: 1  -     azithromycin (Zithromax Z-Tc) 250 MG tablet; Take 2 tablets by mouth on day 1, then 1 tablet daily on days 2-5  Dispense: 6 tablet; Refill: 0  -I will prescribe Flonase nasal spray 2 sprays in each nostril once daily.   -I will prescribe a Z-Tc once daily for 5 days. She will take 2 tablets on the first day and then take them at the same time.    There are no Patient Instructions on file for this visit.  Plan of care reviewed with patient at the conclusion of today's visit. Education was provided regarding diagnosis, management and any prescribed or recommended OTC medications.  Patient verbalizes understanding of and agreement with management plan.    Follow Up  Return for Next Scheduled Follow up.    JUVENTINO Bose     Transcribed from ambient dictation for JUVENTINO Bose by Bridget Skinner.  01/05/24   08:59 EST    Patient or patient representative verbalized consent to the visit recording.  I have personally performed the services described in this document as transcribed by the above individual, and it is both accurate and  complete.

## 2024-02-28 ENCOUNTER — HOSPITAL ENCOUNTER (OUTPATIENT)
Dept: MAMMOGRAPHY | Facility: HOSPITAL | Age: 64
Discharge: HOME | End: 2024-02-28
Admitting: INTERNAL MEDICINE
Payer: MEDICARE

## 2024-02-28 DIAGNOSIS — Z12.31 VISIT FOR SCREENING MAMMOGRAM: ICD-10-CM

## 2024-02-28 PROCEDURE — 77063 BREAST TOMOSYNTHESIS BI: CPT

## 2024-02-28 PROCEDURE — 77067 SCR MAMMO BI INCL CAD: CPT

## 2024-02-29 PROCEDURE — 77067 SCR MAMMO BI INCL CAD: CPT | Performed by: RADIOLOGY

## 2024-02-29 PROCEDURE — 77063 BREAST TOMOSYNTHESIS BI: CPT | Performed by: RADIOLOGY

## 2024-03-19 ENCOUNTER — LAB (OUTPATIENT)
Dept: LAB | Facility: HOSPITAL | Age: 64
End: 2024-03-19
Payer: MEDICARE

## 2024-03-19 ENCOUNTER — OFFICE VISIT (OUTPATIENT)
Dept: INTERNAL MEDICINE | Facility: CLINIC | Age: 64
End: 2024-03-19
Payer: MEDICARE

## 2024-03-19 VITALS
HEIGHT: 64 IN | OXYGEN SATURATION: 98 % | SYSTOLIC BLOOD PRESSURE: 100 MMHG | DIASTOLIC BLOOD PRESSURE: 62 MMHG | TEMPERATURE: 98.4 F | WEIGHT: 115.2 LBS | HEART RATE: 58 BPM | BODY MASS INDEX: 19.67 KG/M2

## 2024-03-19 DIAGNOSIS — D50.9 IRON DEFICIENCY ANEMIA, UNSPECIFIED IRON DEFICIENCY ANEMIA TYPE: ICD-10-CM

## 2024-03-19 DIAGNOSIS — E55.9 VITAMIN D DEFICIENCY: ICD-10-CM

## 2024-03-19 DIAGNOSIS — I49.3 PREMATURE VENTRICULAR CONTRACTIONS (PVCS) (VPCS): ICD-10-CM

## 2024-03-19 DIAGNOSIS — R53.83 OTHER FATIGUE: ICD-10-CM

## 2024-03-19 DIAGNOSIS — R53.83 OTHER FATIGUE: Primary | ICD-10-CM

## 2024-03-19 DIAGNOSIS — K26.9 DUODENAL ULCER: ICD-10-CM

## 2024-03-19 DIAGNOSIS — J30.9 CHRONIC ALLERGIC RHINITIS: ICD-10-CM

## 2024-03-19 LAB
BASOPHILS # BLD AUTO: 0.07 10*3/MM3 (ref 0–0.2)
BASOPHILS NFR BLD AUTO: 1.1 % (ref 0–1.5)
DEPRECATED RDW RBC AUTO: 43.6 FL (ref 37–54)
EOSINOPHIL # BLD AUTO: 0.1 10*3/MM3 (ref 0–0.4)
EOSINOPHIL NFR BLD AUTO: 1.5 % (ref 0.3–6.2)
ERYTHROCYTE [DISTWIDTH] IN BLOOD BY AUTOMATED COUNT: 12.8 % (ref 12.3–15.4)
HCT VFR BLD AUTO: 43.7 % (ref 34–46.6)
HGB BLD-MCNC: 14.3 G/DL (ref 12–15.9)
IMM GRANULOCYTES # BLD AUTO: 0.01 10*3/MM3 (ref 0–0.05)
IMM GRANULOCYTES NFR BLD AUTO: 0.2 % (ref 0–0.5)
LYMPHOCYTES # BLD AUTO: 2.67 10*3/MM3 (ref 0.7–3.1)
LYMPHOCYTES NFR BLD AUTO: 41 % (ref 19.6–45.3)
MCH RBC QN AUTO: 30.6 PG (ref 26.6–33)
MCHC RBC AUTO-ENTMCNC: 32.7 G/DL (ref 31.5–35.7)
MCV RBC AUTO: 93.6 FL (ref 79–97)
MONOCYTES # BLD AUTO: 0.39 10*3/MM3 (ref 0.1–0.9)
MONOCYTES NFR BLD AUTO: 6 % (ref 5–12)
NEUTROPHILS NFR BLD AUTO: 3.27 10*3/MM3 (ref 1.7–7)
NEUTROPHILS NFR BLD AUTO: 50.2 % (ref 42.7–76)
NRBC BLD AUTO-RTO: 0 /100 WBC (ref 0–0.2)
PLATELET # BLD AUTO: 250 10*3/MM3 (ref 140–450)
PMV BLD AUTO: 10.4 FL (ref 6–12)
RBC # BLD AUTO: 4.67 10*6/MM3 (ref 3.77–5.28)
WBC NRBC COR # BLD AUTO: 6.51 10*3/MM3 (ref 3.4–10.8)

## 2024-03-19 PROCEDURE — 82306 VITAMIN D 25 HYDROXY: CPT

## 2024-03-19 PROCEDURE — 82607 VITAMIN B-12: CPT

## 2024-03-19 PROCEDURE — 83540 ASSAY OF IRON: CPT

## 2024-03-19 PROCEDURE — 80053 COMPREHEN METABOLIC PANEL: CPT

## 2024-03-19 PROCEDURE — 36415 COLL VENOUS BLD VENIPUNCTURE: CPT

## 2024-03-19 PROCEDURE — 82728 ASSAY OF FERRITIN: CPT

## 2024-03-19 PROCEDURE — 85025 COMPLETE CBC W/AUTO DIFF WBC: CPT

## 2024-03-19 PROCEDURE — 84466 ASSAY OF TRANSFERRIN: CPT

## 2024-03-19 RX ORDER — TRAZODONE HYDROCHLORIDE 50 MG/1
50 TABLET ORAL NIGHTLY
Start: 2024-03-19

## 2024-03-19 NOTE — PROGRESS NOTES
"[facility] Internal Medicine    Miriam Godoy  1960   [id number]      Patient Care Team:  Agueda Scott PA-C as PCP - General (Physician Assistant)    Chief Complaint::   Chief Complaint   Patient presents with    Fatigue        HPI  Miriam Godoy is a 63 year old female who presents for evaluation of fatigue.   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  She retired early to bring this to a wider audience.  She wanted to teach this to teachers and it is called -[employer]. She quit last year due to ulcer, increased stress.   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   She just found a house in [place] and is getting ready to put house on the market.  Increase stress, gradual weight loss.  She has been hiking and feels \"wiped out and white as a ghost\".  She started taking liquid iron supplement   Feels exhausted.     Patient Active Problem List   Diagnosis    Premature ventricular contractions (PVCs) (VPCs)    Rash    Chronic allergic rhinitis    Internal nasal lesion    Hordeolum internum    Annual physical exam    Acute left-sided low back pain without sciatica    Mixed hyperlipidemia    COVID-19 virus infection    Right lower quadrant pain    Other fatigue    Vitamin D deficiency    Iron deficiency anemia    Duodenal ulcer        Past Medical History:   Diagnosis Date    Annual physical exam 04/15/2019    Aortic root dilatation     Atypical chest pain     History of echocardiogram 04/16/2015    showing estimated EF 55% to 60% with mild aortic regurgitation and moderate dilatation of the aortic root    History of EKG     normal    History of palpitations     Osteopenia        Past Surgical History:   Procedure Laterality Date    APPENDECTOMY      OOPHORECTOMY Bilateral        Family History   Problem Relation Age of Onset    Breast cancer Mother 43    Breast " cancer Paternal Grandmother 47    Coronary artery disease Father 57        premature    Heart disease Father         Had heart surgery and congestive heart disease    Ovarian cancer Neg Hx        Social History     Socioeconomic History    Marital status:    Tobacco Use    Smoking status: Former     Current packs/day: 0.00     Average packs/day: 0.5 packs/day for 9.0 years (4.5 ttl pk-yrs)     Types: Cigarettes     Start date: 1972     Quit date: 1981     Years since quittin.2    Smokeless tobacco: Never   Vaping Use    Vaping status: Never Used   Substance and Sexual Activity    Alcohol use: Not Currently     Comment: rare    Drug use: No    Sexual activity: Yes     Partners: Male     Birth control/protection: Post-menopausal       Allergies   Allergen Reactions    Penicillins Rash       Review of Systems   Constitutional:  Positive for fatigue and unexpected weight loss. Negative for activity change, appetite change, diaphoresis and unexpected weight gain.   HENT:  Negative for hearing loss.    Eyes:  Negative for visual disturbance.   Respiratory:  Negative for chest tightness and shortness of breath.    Cardiovascular:  Negative for chest pain, palpitations and leg swelling.   Gastrointestinal:  Negative for abdominal pain, blood in stool, GERD and indigestion.   Endocrine: Negative for cold intolerance and heat intolerance.   Genitourinary:  Negative for dysuria and hematuria.   Musculoskeletal:  Negative for arthralgias and myalgias.   Skin:  Negative for skin lesions.   Neurological:  Negative for tremors, seizures, syncope, speech difficulty, weakness, headache, memory problem and confusion.   Hematological:  Does not bruise/bleed easily.   Psychiatric/Behavioral:  Negative for sleep disturbance and depressed mood. The patient is not nervous/anxious.         Vital Signs  Vitals:    24 1432   BP: 100/62   BP Location: Left arm   Patient Position: Sitting   Cuff Size: Adult   Pulse:  "58   Temp: 98.4 °F (36.9 °C)   TempSrc: Infrared   SpO2: 98%   Weight: 52.3 kg (115 lb 3.2 oz)   Height: 162.6 cm (64.02\")   PainSc: 0-No pain     Body mass index is 19.76 kg/m².  BMI is within normal parameters. No other follow-up for BMI required.     Advance Care Planning   ACP discussion was held with the patient during this visit. Patient does not have an advance directive, information provided.       Current Outpatient Medications:     BIOTIN PO, Take 1 tablet by mouth Daily., Disp: , Rfl:     Cholecalciferol (VITAMIN D-3) 5000 units tablet, Take 1 tablet by mouth Daily., Disp: , Rfl:     fluticasone (FLONASE) 50 MCG/ACT nasal spray, 2 sprays into the nostril(s) as directed by provider Daily., Disp: 18.2 mL, Rfl: 1    Multiple Vitamins-Minerals (MULTIVITAMIN ADULT PO), Take 1 tablet by mouth Daily., Disp: , Rfl:     Omega-3 Fatty Acids (FISH OIL) 1000 MG capsule capsule, Take 1 capsule by mouth Daily With Breakfast., Disp: , Rfl:     Probiotic Product (PROBIOTIC DAILY PO), Take 1 tablet by mouth Daily., Disp: , Rfl:     traZODone (DESYREL) 50 MG tablet, Take 1 tablet by mouth Every Night., Disp: , Rfl:     Physical Exam  Vitals reviewed.   Constitutional:       Appearance: Normal appearance. She is well-developed.   HENT:      Head: Normocephalic and atraumatic.      Right Ear: Hearing, tympanic membrane, ear canal and external ear normal.      Left Ear: Hearing, tympanic membrane, ear canal and external ear normal.      Nose: Nose normal.      Mouth/Throat:      Mouth: Mucous membranes are moist.      Pharynx: Oropharynx is clear. Uvula midline.   Eyes:      General: Lids are normal.      Conjunctiva/sclera: Conjunctivae normal.      Pupils: Pupils are equal, round, and reactive to light.   Cardiovascular:      Rate and Rhythm: Normal rate and regular rhythm.      Heart sounds: Normal heart sounds.   Pulmonary:      Effort: Pulmonary effort is normal.      Breath sounds: Normal breath sounds.   Abdominal:     "  General: Bowel sounds are normal.      Palpations: Abdomen is soft.   Musculoskeletal:         General: Normal range of motion.      Cervical back: Full passive range of motion without pain, normal range of motion and neck supple.   Skin:     General: Skin is warm and dry.   Neurological:      General: No focal deficit present.      Mental Status: She is alert and oriented to person, place, and time. Mental status is at baseline.      Deep Tendon Reflexes: Reflexes are normal and symmetric.   Psychiatric:         Speech: Speech normal.         Behavior: Behavior normal.         Thought Content: Thought content normal.         Judgment: Judgment normal.          ACE III MINI            Results Review:    Recent Results (from the past 672 hour(s))   Comprehensive Metabolic Panel    Collection Time: 03/19/24  3:47 PM    Specimen: Blood   Result Value Ref Range    Glucose 97 65 - 99 mg/dL    BUN 23 8 - 23 mg/dL    Creatinine 0.97 0.57 - 1.00 mg/dL    Sodium 143 136 - 145 mmol/L    Potassium 4.1 3.5 - 5.2 mmol/L    Chloride 103 98 - 107 mmol/L    CO2 29.9 (H) 22.0 - 29.0 mmol/L    Calcium 10.0 8.6 - 10.5 mg/dL    Total Protein 7.1 6.0 - 8.5 g/dL    Albumin 4.4 3.5 - 5.2 g/dL    ALT (SGPT) 18 1 - 33 U/L    AST (SGOT) 26 1 - 32 U/L    Alkaline Phosphatase 90 39 - 117 U/L    Total Bilirubin 0.3 0.0 - 1.2 mg/dL    Globulin 2.7 gm/dL    A/G Ratio 1.6 g/dL    BUN/Creatinine Ratio 23.7 7.0 - 25.0    Anion Gap 10.1 5.0 - 15.0 mmol/L    eGFR 65.0 >60.0 mL/min/1.73   Vitamin B12    Collection Time: 03/19/24  3:47 PM    Specimen: Blood   Result Value Ref Range    Vitamin B-12 852 211 - 946 pg/mL   Vitamin D,25-Hydroxy    Collection Time: 03/19/24  3:47 PM    Specimen: Blood   Result Value Ref Range    25 Hydroxy, Vitamin D 57.3 30.0 - 100.0 ng/ml   Iron Profile    Collection Time: 03/19/24  3:47 PM    Specimen: Blood   Result Value Ref Range    Iron 96 37 - 145 mcg/dL    Iron Saturation (TSAT) 27 20 - 50 %    Transferrin 241 200 -  360 mg/dL    TIBC 359 298 - 536 mcg/dL   Ferritin    Collection Time: 24  3:47 PM    Specimen: Blood   Result Value Ref Range    Ferritin 83.00 13.00 - 150.00 ng/mL   CBC Auto Differential    Collection Time: 24  3:47 PM    Specimen: Blood   Result Value Ref Range    WBC 6.51 3.40 - 10.80 10*3/mm3    RBC 4.67 3.77 - 5.28 10*6/mm3    Hemoglobin 14.3 12.0 - 15.9 g/dL    Hematocrit 43.7 34.0 - 46.6 %    MCV 93.6 79.0 - 97.0 fL    MCH 30.6 26.6 - 33.0 pg    MCHC 32.7 31.5 - 35.7 g/dL    RDW 12.8 12.3 - 15.4 %    RDW-SD 43.6 37.0 - 54.0 fl    MPV 10.4 6.0 - 12.0 fL    Platelets 250 140 - 450 10*3/mm3    Neutrophil % 50.2 42.7 - 76.0 %    Lymphocyte % 41.0 19.6 - 45.3 %    Monocyte % 6.0 5.0 - 12.0 %    Eosinophil % 1.5 0.3 - 6.2 %    Basophil % 1.1 0.0 - 1.5 %    Immature Grans % 0.2 0.0 - 0.5 %    Neutrophils, Absolute 3.27 1.70 - 7.00 10*3/mm3    Lymphocytes, Absolute 2.67 0.70 - 3.10 10*3/mm3    Monocytes, Absolute 0.39 0.10 - 0.90 10*3/mm3    Eosinophils, Absolute 0.10 0.00 - 0.40 10*3/mm3    Basophils, Absolute 0.07 0.00 - 0.20 10*3/mm3    Immature Grans, Absolute 0.01 0.00 - 0.05 10*3/mm3    nRBC 0.0 0.0 - 0.2 /100 WBC       ECG 12 Lead    Date/Time: 3/22/2024 9:17 AM  Performed by: Agueda Scott PA-C    Authorized by: Agueda Scott PA-C  Rhythm: sinus bradycardia  BPM: 55    Clinical impression: normal ECG  Comments: This bradycardia ventricular rate of 55 bpm        Medication Review: Medications reviewed and noted    Social History     Socioeconomic History    Marital status:    Tobacco Use    Smoking status: Former     Current packs/day: 0.00     Average packs/day: 0.5 packs/day for 9.0 years (4.5 ttl pk-yrs)     Types: Cigarettes     Start date: 1972     Quit date: 1981     Years since quittin.2    Smokeless tobacco: Never   Vaping Use    Vaping status: Never Used   Substance and Sexual Activity    Alcohol use: Not Currently     Comment: rare    Drug use: No     Sexual activity: Yes     Partners: Male     Birth control/protection: Post-menopausal        Assessment/Plan:    Diagnoses and all orders for this visit:    1. Other fatigue (Primary)  -     CBC & Differential; Future  -     Comprehensive Metabolic Panel; Future  -     Vitamin B12; Future  -     ECG 12 Lead    2. Vitamin D deficiency  -     Vitamin D,25-Hydroxy; Future    3. Iron deficiency anemia, unspecified iron deficiency anemia type    4. Duodenal ulcer  -     Iron Profile; Future  -     Ferritin; Future    5. Chronic allergic rhinitis    6. Premature ventricular contractions (PVCs) (VPCs)  Overview:  EKG reviewed with patient.  Sinus bradycardia      Other orders  -     traZODone (DESYREL) 50 MG tablet; Take 1 tablet by mouth Every Night.       Increase protein intake.  Try and drink plenty water throughout the day.    Plan of care reviewed with patient at the conclusion of today's visit. Education was provided regarding diagnosis, management, and any prescribed or recommended OTC medications.Patient verbalizes understanding of and agreement with management plan.             Agueda Scott PA-C      Note: Part of this note may be an electronic transcription/translation of spoken language to printed text using the Dragon Dictation system.

## 2024-03-20 LAB
25(OH)D3 SERPL-MCNC: 57.3 NG/ML (ref 30–100)
ALBUMIN SERPL-MCNC: 4.4 G/DL (ref 3.5–5.2)
ALBUMIN/GLOB SERPL: 1.6 G/DL
ALP SERPL-CCNC: 90 U/L (ref 39–117)
ALT SERPL W P-5'-P-CCNC: 18 U/L (ref 1–33)
ANION GAP SERPL CALCULATED.3IONS-SCNC: 10.1 MMOL/L (ref 5–15)
AST SERPL-CCNC: 26 U/L (ref 1–32)
BILIRUB SERPL-MCNC: 0.3 MG/DL (ref 0–1.2)
BUN SERPL-MCNC: 23 MG/DL (ref 8–23)
BUN/CREAT SERPL: 23.7 (ref 7–25)
CALCIUM SPEC-SCNC: 10 MG/DL (ref 8.6–10.5)
CHLORIDE SERPL-SCNC: 103 MMOL/L (ref 98–107)
CO2 SERPL-SCNC: 29.9 MMOL/L (ref 22–29)
CREAT SERPL-MCNC: 0.97 MG/DL (ref 0.57–1)
EGFRCR SERPLBLD CKD-EPI 2021: 65 ML/MIN/1.73
FERRITIN SERPL-MCNC: 83 NG/ML (ref 13–150)
GLOBULIN UR ELPH-MCNC: 2.7 GM/DL
GLUCOSE SERPL-MCNC: 97 MG/DL (ref 65–99)
IRON 24H UR-MRATE: 96 MCG/DL (ref 37–145)
IRON SATN MFR SERPL: 27 % (ref 20–50)
POTASSIUM SERPL-SCNC: 4.1 MMOL/L (ref 3.5–5.2)
PROT SERPL-MCNC: 7.1 G/DL (ref 6–8.5)
SODIUM SERPL-SCNC: 143 MMOL/L (ref 136–145)
TIBC SERPL-MCNC: 359 MCG/DL (ref 298–536)
TRANSFERRIN SERPL-MCNC: 241 MG/DL (ref 200–360)
VIT B12 BLD-MCNC: 852 PG/ML (ref 211–946)

## 2024-03-22 PROBLEM — E55.9 VITAMIN D DEFICIENCY: Status: ACTIVE | Noted: 2024-03-22

## 2024-03-22 PROBLEM — K26.9 DUODENAL ULCER: Status: ACTIVE | Noted: 2024-03-22

## 2024-03-22 PROBLEM — R53.83 OTHER FATIGUE: Status: ACTIVE | Noted: 2024-03-22

## 2024-03-22 PROBLEM — D50.9 IRON DEFICIENCY ANEMIA: Status: ACTIVE | Noted: 2024-03-22

## 2024-04-17 ENCOUNTER — TELEPHONE (OUTPATIENT)
Dept: INTERNAL MEDICINE | Facility: CLINIC | Age: 64
End: 2024-04-17
Payer: MEDICARE

## 2024-04-17 DIAGNOSIS — E78.2 MIXED HYPERLIPIDEMIA: Primary | ICD-10-CM

## 2024-04-17 NOTE — TELEPHONE ENCOUNTER
Caller: Miriam Godoy    Relationship: Self    Best call back number: [phone]     What orders are you requesting (i.e. lab or imaging): BLOOD WORK     In what timeframe would the patient need to come in: IN THE MORNING TOMORROW 4/18/24     Where will you receive your lab/imaging services: [facility]    Additional notes: PATIENT HAS A WELCOME TO MEDICARE VISIT ON 4/18/24 AND SHE WOULD LIKE TO GET THE BLOOD WORK DONE FOR THIS APPOINTMENT IN THE MORNING.

## 2024-04-18 ENCOUNTER — OFFICE VISIT (OUTPATIENT)
Dept: INTERNAL MEDICINE | Facility: CLINIC | Age: 64
End: 2024-04-18
Payer: MEDICARE

## 2024-04-18 ENCOUNTER — LAB (OUTPATIENT)
Dept: LAB | Facility: HOSPITAL | Age: 64
End: 2024-04-18
Payer: MEDICARE

## 2024-04-18 VITALS
HEART RATE: 67 BPM | HEIGHT: 64 IN | TEMPERATURE: 98.7 F | BODY MASS INDEX: 19.26 KG/M2 | WEIGHT: 112.8 LBS | DIASTOLIC BLOOD PRESSURE: 62 MMHG | SYSTOLIC BLOOD PRESSURE: 98 MMHG

## 2024-04-18 DIAGNOSIS — E55.9 VITAMIN D DEFICIENCY: ICD-10-CM

## 2024-04-18 DIAGNOSIS — E78.2 MIXED HYPERLIPIDEMIA: ICD-10-CM

## 2024-04-18 DIAGNOSIS — D50.8 OTHER IRON DEFICIENCY ANEMIA: ICD-10-CM

## 2024-04-18 DIAGNOSIS — F41.9 ANXIETY: ICD-10-CM

## 2024-04-18 DIAGNOSIS — Z00.00 MEDICARE ANNUAL WELLNESS VISIT, INITIAL: Primary | ICD-10-CM

## 2024-04-18 LAB
CHOLEST SERPL-MCNC: 183 MG/DL (ref 0–200)
HDLC SERPL-MCNC: 74 MG/DL (ref 40–60)
LDLC SERPL CALC-MCNC: 101 MG/DL (ref 0–100)
LDLC/HDLC SERPL: 1.36 {RATIO}
TRIGL SERPL-MCNC: 41 MG/DL (ref 0–150)
VLDLC SERPL-MCNC: 8 MG/DL (ref 5–40)

## 2024-04-18 PROCEDURE — 80061 LIPID PANEL: CPT

## 2024-04-18 RX ORDER — TEMAZEPAM 7.5 MG/1
7.5 CAPSULE ORAL NIGHTLY PRN
Qty: 30 CAPSULE | Refills: 0 | Status: SHIPPED | OUTPATIENT
Start: 2024-04-18 | End: 2024-04-22

## 2024-04-18 NOTE — PATIENT INSTRUCTIONS
Advance Directive    Advance directives are legal documents that allow you to make decisions about your health care and medical treatment in case you become unable to communicate for yourself. Advance directives let your wishes be known to family, friends, and health care providers.  Discussing and writing advance directives should happen over time rather than all at once. Advance directives can be changed and updated at any time. There are different types of advance directives, such as:  Medical power of .  Living will.  Do not resuscitate (DNR) order or do not attempt resuscitation (DNAR) order.  Health care proxy and medical power of   A health care proxy is also called a health care agent. This person is appointed to make medical decisions for you when you are unable to make decisions for yourself. Generally, people ask a trusted friend or family member to act as their proxy and represent their preferences. Make sure you have an agreement with your trusted person to act as your proxy. A proxy may have to make a medical decision on your behalf if your wishes are not known.  A medical power of , also called a durable power of  for health care, is a legal document that names your health care proxy. Depending on the laws in your state, the document may need to be:  Signed.  Notarized.  Dated.  Copied.  Witnessed.  Incorporated into your medical record.  You may also want to appoint a trusted person to manage your money in the event you are unable to do so. This is called a durable power of  for finances. It is a separate legal document from the durable power of  for health care. You may choose your health care proxy or someone different to act as your agent in money matters.  If you do not appoint a proxy, or there is a concern that the proxy is not acting in your best interest, a court may appoint a guardian to act on your behalf.  Living will  A living will is a set  of instructions that state your wishes about medical care when you cannot express them yourself. Health care providers should keep a copy of your living will in your medical record. You may want to give a copy to family members or friends. To alert caregivers in case of an emergency, you can place a card in your wallet to let them know that you have a living will and where they can find it. A living will is used if you become:  Terminally ill.  Disabled.  Unable to communicate or make decisions.  The following decisions should be included in your living will:  To use or not to use life support equipment, such as dialysis machines and breathing machines (ventilators).  Whether you want a DNR or DNAR order. This tells health care providers not to use cardiopulmonary resuscitation (CPR) if breathing or heartbeat stops.  To use or not to use tube feeding.  To be given or not to be given food and fluids.  Whether you want comfort (palliative) care when the goal becomes comfort rather than a cure.  Whether you want to donate your organs and tissues.  A living will does not give instructions for distributing your money and property if you should pass away.  DNR or DNAR  A DNR or DNAR order is a request not to have CPR in the event that your heart stops beating or you stop breathing. If a DNR or DNAR order has not been made and shared, a health care provider will try to help any patient whose heart has stopped or who has stopped breathing. If you plan to have surgery, talk with your health care provider about how your DNR or DNAR order will be followed if problems occur.  What if I do not have an advance directive?  Some states assign family decision makers to act on your behalf if you do not have an advance directive. Each state has its own laws about advance directives. You may want to check with your health care provider, , or state representative about the laws in your state.  Summary  Advance directives are  legal documents that allow you to make decisions about your health care and medical treatment in case you become unable to communicate for yourself.  The process of discussing and writing advance directives should happen over time. You can change and update advance directives at any time.  Advance directives may include a medical power of , a living will, and a DNR or DNAR order.  This information is not intended to replace advice given to you by your health care provider. Make sure you discuss any questions you have with your health care provider.  Document Revised: 09/21/2021 Document Reviewed: 09/21/2021  [employer] Patient Education © 2024 [employer]  Advance Directive    Advance directives are legal documents that allow you to make decisions about your health care and medical treatment in case you become unable to communicate for yourself. Advance directives let your wishes be known to family, friends, and health care providers.  Discussing and writing advance directives should happen over time rather than all at once. Advance directives can be changed and updated at any time. There are different types of advance directives, such as:  Medical power of .  Living will.  Do not resuscitate (DNR) order or do not attempt resuscitation (DNAR) order.  Health care proxy and medical power of   A health care proxy is also called a health care agent. This person is appointed to make medical decisions for you when you are unable to make decisions for yourself. Generally, people ask a trusted friend or family member to act as their proxy and represent their preferences. Make sure you have an agreement with your trusted person to act as your proxy. A proxy may have to make a medical decision on your behalf if your wishes are not known.  A medical power of , also called a durable power of  for health care, is a legal document that names your health care proxy. Depending on the laws in  your state, the document may need to be:  Signed.  Notarized.  Dated.  Copied.  Witnessed.  Incorporated into your medical record.  You may also want to appoint a trusted person to manage your money in the event you are unable to do so. This is called a durable power of  for finances. It is a separate legal document from the durable power of  for health care. You may choose your health care proxy or someone different to act as your agent in money matters.  If you do not appoint a proxy, or there is a concern that the proxy is not acting in your best interest, a court may appoint a guardian to act on your behalf.  Living will  A living will is a set of instructions that state your wishes about medical care when you cannot express them yourself. Health care providers should keep a copy of your living will in your medical record. You may want to give a copy to family members or friends. To alert caregivers in case of an emergency, you can place a card in your wallet to let them know that you have a living will and where they can find it. A living will is used if you become:  Terminally ill.  Disabled.  Unable to communicate or make decisions.  The following decisions should be included in your living will:  To use or not to use life support equipment, such as dialysis machines and breathing machines (ventilators).  Whether you want a DNR or DNAR order. This tells health care providers not to use cardiopulmonary resuscitation (CPR) if breathing or heartbeat stops.  To use or not to use tube feeding.  To be given or not to be given food and fluids.  Whether you want comfort (palliative) care when the goal becomes comfort rather than a cure.  Whether you want to donate your organs and tissues.  A living will does not give instructions for distributing your money and property if you should pass away.  DNR or DNAR  A DNR or DNAR order is a request not to have CPR in the event that your heart stops beating or  you stop breathing. If a DNR or DNAR order has not been made and shared, a health care provider will try to help any patient whose heart has stopped or who has stopped breathing. If you plan to have surgery, talk with your health care provider about how your DNR or DNAR order will be followed if problems occur.  What if I do not have an advance directive?  Some states assign family decision makers to act on your behalf if you do not have an advance directive. Each state has its own laws about advance directives. You may want to check with your health care provider, , or state representative about the laws in your state.  Summary  Advance directives are legal documents that allow you to make decisions about your health care and medical treatment in case you become unable to communicate for yourself.  The process of discussing and writing advance directives should happen over time. You can change and update advance directives at any time.  Advance directives may include a medical power of , a living will, and a DNR or DNAR order.  This information is not intended to replace advice given to you by your health care provider. Make sure you discuss any questions you have with your health care provider.  Document Revised: 2021 Document Reviewed: 2021  [employer] Patient Education ©  [employer] Inc[employer]    Medicare Wellness  Personal Prevention Plan of Service     Date of Office Visit:    Encounter Provider:  Agueda Scott PA-C  Place of Service:  [facility] INTERNAL MEDICINE  Patient Name: Miriam Godoy  :  1960    As part of the Medicare Wellness portion of your visit today, we are providing you with this personalized preventive plan of services (PPPS). This plan is based upon recommendations of the [place] Preventive Services Task Force (USPSTF) and the Advisory Committee on Immunization Practices (ACIP).    This lists the preventive care services that  should be considered, and provides dates of when you are due. Items listed as completed are up-to-date and do not require any further intervention.    Health Maintenance   Topic Date Due    ZOSTER VACCINE (1 of 2) Never done    ANNUAL WELLNESS VISIT  Never done    PAP SMEAR  Never done    Pneumococcal Vaccine 65+ (1 of 1 - PCV) Never done    LIPID PANEL  03/17/2024    COVID-19 Vaccine (4 - 2023-24 season) 06/08/2024 (Originally 9/1/2023)    RSV Vaccine - Adults (1 - 1-dose 60+ series) 03/19/2025 (Originally 11/6/2018)    INFLUENZA VACCINE  08/01/2024    DXA SCAN  01/23/2025    TDAP/TD VACCINES (2 - Td or Tdap) 01/05/2026    MAMMOGRAM  02/28/2026    COLORECTAL CANCER SCREENING  12/15/2033    HEPATITIS C SCREENING  Completed       No orders of the defined types were placed in this encounter.      No follow-ups on file.

## 2024-04-18 NOTE — PROGRESS NOTES
QUICK REFERENCE INFORMATION:  The ABCs of the Annual Wellness Visit    Welcome to Medicare Visit    HEALTH RISK ASSESSMENT    1960    Recent Hospitalizations:  No hospitalization(s) within the last year..      Current Medical Providers:  Patient Care Team:  Agueda Scott PA-C as PCP - General (Physician Assistant)      Smoking Status:  Social History     Tobacco Use   Smoking Status Former    Current packs/day: 0.00    Average packs/day: 0.5 packs/day for 9.0 years (4.5 ttl pk-yrs)    Types: Cigarettes    Start date: 1972    Quit date: 1981    Years since quittin.3   Smokeless Tobacco Never       Alcohol Consumption:  Social History     Substance and Sexual Activity   Alcohol Use Not Currently    Comment: rare       Depression Screen:       2024     1:06 PM   PHQ-2/PHQ-9 Depression Screening   Little Interest or Pleasure in Doing Things 0-->not at all   Feeling Down, Depressed or Hopeless 0-->not at all   PHQ-9: Brief Depression Severity Measure Score 0       Health Habits and Functional and Cognitive Screenin/17/2024     2:33 PM   Functional & Cognitive Status   Do you have difficulty preparing food and eating? No   Do you have difficulty bathing yourself, getting dressed or grooming yourself? No   Do you have difficulty using the toilet? No   Do you have difficulty moving around from place to place? No   Do you have trouble with steps or getting out of a bed or a chair? No   Current Diet Well Balanced Diet   Dental Exam Up to date   Eye Exam Up to date   Exercise (times per week) 5 times per week   Do you need help using the phone?  No   Are you deaf or do you have serious difficulty hearing?  No   Do you need help to go to places out of walking distance? No   Do you need help shopping? No   Do you need help preparing meals?  No   Do you need help with housework?  No   Do you need help with laundry? No   Do you need help taking your medications? No   Do you need help  "managing money? No   Do you ever drive or ride in a car without wearing a seat belt? No   Have you felt unusual stress, anger or loneliness in the last month? No   Who do you live with? Spouse   If you need help, do you have trouble finding someone available to you? No   Have you been bothered in the last four weeks by sexual problems? No   Do you have difficulty concentrating, remembering or making decisions? No       Fall Risk Screen:  GONZALO Fall Risk Assessment was completed, and patient is at LOW risk for falls.Assessment completed on:4/18/2024    ACE III MINI        Does the patient have evidence of cognitive impairment? No    Aspirin use counseling? Does not need ASA but is currently taking (advised patient that ASA is not indicated and patient chooses to stop it)      Recent Lab Results:  CMP:  Lab Results   Component Value Date    BUN 23 03/19/2024    CREATININE 0.97 03/19/2024    EGFRIFNONA 74 01/14/2022    BCR 23.7 03/19/2024     03/19/2024    K 4.1 03/19/2024    CO2 29.9 (H) 03/19/2024    CALCIUM 10.0 03/19/2024    ALBUMIN 4.4 03/19/2024    BILITOT 0.3 03/19/2024    ALKPHOS 90 03/19/2024    AST 26 03/19/2024    ALT 18 03/19/2024     HbA1c:  Lab Results   Component Value Date    HGBA1C 5.6 03/20/2023     Microalbumin:  No results found for: \"MICROALBUR\", \"POCMALB\", \"POCCREAT\"  Lipid Panel  Lab Results   Component Value Date    CHOL 210 (H) 03/17/2023    TRIG 74 03/17/2023    HDL 81 (H) 03/17/2023     (H) 03/17/2023    AST 26 03/19/2024    ALT 18 03/19/2024       Visual Acuity:  Vision Screening    Right eye Left eye Both eyes   Without correction 20/40 20/50 20/20   With correction          Age-appropriate Screening Schedule:  Refer to the list below for future screening recommendations based on patient's age, sex and/or medical conditions. Orders for these recommended tests are listed in the plan section. The patient has been provided with a written plan.    Health Maintenance   Topic Date " Due    LIPID PANEL  03/17/2024    COVID-19 Vaccine (4 - 2023-24 season) 06/08/2024 (Originally 9/1/2023)    RSV Vaccine - Adults (1 - 1-dose 60+ series) 03/19/2025 (Originally 11/6/2018)    ZOSTER VACCINE (1 of 2) 04/18/2025 (Originally 11/6/2008)    INFLUENZA VACCINE  08/01/2024    DXA SCAN  01/23/2025    ANNUAL WELLNESS VISIT  04/18/2025    TDAP/TD VACCINES (2 - Td or Tdap) 01/05/2026    MAMMOGRAM  02/28/2026    PAP SMEAR  11/23/2026    COLORECTAL CANCER SCREENING  12/15/2033    HEPATITIS C SCREENING  Completed    Pneumococcal Vaccine 65+  Completed        Subjective   History of Present Illness    Miriam Godoy is a 63 y.o. female an established patient presenting for a Welcome to Medicare Visit.  Past medical history significant for hyperlipidemia, vitamin D deficiency, iron deficiency, and insomnia.  Recently completed Pneumovax vaccine at pharmacy.  She is current on routine screenings.  She denies chest pain, shortness of breath, palpitations, or headaches.  Started taking adrenal support supplement daily.  Reports this helps with her daily stress.    CHRONIC CONDITIONS    The following portions of the patient's history were reviewed and updated as appropriate: allergies, current medications, past family history, past medical history, past social history, past surgical history, and problem list.    Outpatient Medications Prior to Visit   Medication Sig Dispense Refill    BIOTIN PO Take 1 tablet by mouth Daily.      Cholecalciferol (VITAMIN D-3) 5000 units tablet Take 1 tablet by mouth Daily.      fluticasone (FLONASE) 50 MCG/ACT nasal spray 2 sprays into the nostril(s) as directed by provider Daily. 18.2 mL 1    Multiple Vitamins-Minerals (MULTIVITAMIN ADULT PO) Take 1 tablet by mouth Daily.      Omega-3 Fatty Acids (FISH OIL) 1000 MG capsule capsule Take 1 capsule by mouth Daily With Breakfast.      Probiotic Product (PROBIOTIC DAILY PO) Take 1 tablet by mouth Daily.      traZODone (DESYREL) 50 MG tablet  Take 1 tablet by mouth Every Night.       No facility-administered medications prior to visit.       Patient Active Problem List   Diagnosis    Premature ventricular contractions (PVCs) (VPCs)    Rash    Chronic allergic rhinitis    Internal nasal lesion    Hordeolum internum    Annual physical exam    Acute left-sided low back pain without sciatica    Mixed hyperlipidemia    COVID-19 virus infection    Right lower quadrant pain    Other fatigue    Vitamin D deficiency    Iron deficiency anemia    Duodenal ulcer    Medicare annual wellness visit, initial    Anxiety       Advance Care Planning:  ACP discussion was held with the patient during this visit. Patient does not have an advance directive, information provided.    Identification of Risk Factors:  Risk factors include: Advance Directive Discussion.    Review of Systems   Constitutional:  Negative for chills, fatigue and fever.   HENT:  Negative for congestion, ear pain and sinus pressure.    Respiratory:  Negative for cough, chest tightness, shortness of breath and wheezing.    Cardiovascular:  Negative for chest pain and palpitations.   Gastrointestinal:  Negative for abdominal pain, blood in stool and constipation.   Skin:  Negative for color change.   Allergic/Immunologic: Negative for environmental allergies.   Neurological:  Negative for dizziness, speech difficulty and headaches.   Psychiatric/Behavioral:  Negative for confusion. The patient is not nervous/anxious.        Compared to one year ago, the patient feels her physical health is better.  Compared to one year ago, the patient feels her mental health is better.    Objective    Physical Exam  Vitals reviewed.   Constitutional:       Appearance: Normal appearance. She is well-developed.   HENT:      Head: Normocephalic and atraumatic.      Right Ear: Hearing, tympanic membrane, ear canal and external ear normal.      Left Ear: Hearing, tympanic membrane, ear canal and external ear normal.      Nose:  "Nose normal.      Mouth/Throat:      Pharynx: Uvula midline.   Eyes:      General: Lids are normal.      Conjunctiva/sclera: Conjunctivae normal.      Pupils: Pupils are equal, round, and reactive to light.   Cardiovascular:      Rate and Rhythm: Normal rate and regular rhythm.      Heart sounds: Normal heart sounds.   Pulmonary:      Effort: Pulmonary effort is normal.      Breath sounds: Normal breath sounds.   Abdominal:      General: Bowel sounds are normal.      Palpations: Abdomen is soft.   Musculoskeletal:         General: Normal range of motion.      Cervical back: Full passive range of motion without pain, normal range of motion and neck supple.   Skin:     General: Skin is warm and dry.   Neurological:      Mental Status: She is alert and oriented to person, place, and time.      Deep Tendon Reflexes: Reflexes are normal and symmetric.   Psychiatric:         Speech: Speech normal.         Behavior: Behavior normal.         Thought Content: Thought content normal.         Judgment: Judgment normal.          Vitals:    04/18/24 1303   BP: 98/62   BP Location: Left arm   Patient Position: Sitting   Cuff Size: Adult   Pulse: 67   Temp: 98.7 °F (37.1 °C)   TempSrc: Infrared   Weight: 51.2 kg (112 lb 12.8 oz)   Height: 162.6 cm (64.02\")   PainSc: 0-No pain       BMI is within normal parameters. No other follow-up for BMI required.      Procedure   Procedures       Assessment & Plan   Problem List Items Addressed This Visit       Mixed hyperlipidemia    Overview     Continue to eat a low-fat low-cholesterol diet.         Vitamin D deficiency    Overview     Currently taking vitamin D 5000 units daily.         Iron deficiency anemia    Medicare annual wellness visit, initial - Primary    Overview     No falls in the past 6 months.  She is active, exercises regularly.  Some concerns with weight loss, will continue to monitor.  Discussed importance of regular exercise.  She is current on age-related screenings.   "       Anxiety    Overview     Worse at night.  Trial of restoril.         Relevant Medications    temazepam (RESTORIL) 7.5 MG capsule     Patient Self-Management and Personalized Health Advice  The patient has been provided with information about: exercise and preventive services including:   Annual Wellness Visit (AWV).    Outpatient Encounter Medications as of 4/18/2024   Medication Sig Dispense Refill    BIOTIN PO Take 1 tablet by mouth Daily.      Cholecalciferol (VITAMIN D-3) 5000 units tablet Take 1 tablet by mouth Daily.      fluticasone (FLONASE) 50 MCG/ACT nasal spray 2 sprays into the nostril(s) as directed by provider Daily. 18.2 mL 1    Multiple Vitamins-Minerals (MULTIVITAMIN ADULT PO) Take 1 tablet by mouth Daily.      Omega-3 Fatty Acids (FISH OIL) 1000 MG capsule capsule Take 1 capsule by mouth Daily With Breakfast.      Probiotic Product (PROBIOTIC DAILY PO) Take 1 tablet by mouth Daily.      traZODone (DESYREL) 50 MG tablet Take 1 tablet by mouth Every Night.      temazepam (RESTORIL) 7.5 MG capsule Take 1 capsule by mouth At Night As Needed for Sleep. 30 capsule 0     No facility-administered encounter medications on file as of 4/18/2024.       Reviewed use of high risk medication in the elderly: yes  Reviewed for potential of harmful drug interactions in the elderly: not applicable    Follow Up:  Return in about 6 months (around 10/18/2024).     Patient Instructions   Advance Directive    Advance directives are legal documents that allow you to make decisions about your health care and medical treatment in case you become unable to communicate for yourself. Advance directives let your wishes be known to family, friends, and health care providers.  Discussing and writing advance directives should happen over time rather than all at once. Advance directives can be changed and updated at any time. There are different types of advance directives, such as:  Medical power of .  Living will.  Do  not resuscitate (DNR) order or do not attempt resuscitation (DNAR) order.  Health care proxy and medical power of   A health care proxy is also called a health care agent. This person is appointed to make medical decisions for you when you are unable to make decisions for yourself. Generally, people ask a trusted friend or family member to act as their proxy and represent their preferences. Make sure you have an agreement with your trusted person to act as your proxy. A proxy may have to make a medical decision on your behalf if your wishes are not known.  A medical power of , also called a durable power of  for health care, is a legal document that names your health care proxy. Depending on the laws in your state, the document may need to be:  Signed.  Notarized.  Dated.  Copied.  Witnessed.  Incorporated into your medical record.  You may also want to appoint a trusted person to manage your money in the event you are unable to do so. This is called a durable power of  for finances. It is a separate legal document from the durable power of  for health care. You may choose your health care proxy or someone different to act as your agent in money matters.  If you do not appoint a proxy, or there is a concern that the proxy is not acting in your best interest, a court may appoint a guardian to act on your behalf.  Living will  A living will is a set of instructions that state your wishes about medical care when you cannot express them yourself. Health care providers should keep a copy of your living will in your medical record. You may want to give a copy to family members or friends. To alert caregivers in case of an emergency, you can place a card in your wallet to let them know that you have a living will and where they can find it. A living will is used if you become:  Terminally ill.  Disabled.  Unable to communicate or make decisions.  The following decisions should be  included in your living will:  To use or not to use life support equipment, such as dialysis machines and breathing machines (ventilators).  Whether you want a DNR or DNAR order. This tells health care providers not to use cardiopulmonary resuscitation (CPR) if breathing or heartbeat stops.  To use or not to use tube feeding.  To be given or not to be given food and fluids.  Whether you want comfort (palliative) care when the goal becomes comfort rather than a cure.  Whether you want to donate your organs and tissues.  A living will does not give instructions for distributing your money and property if you should pass away.  DNR or DNAR  A DNR or DNAR order is a request not to have CPR in the event that your heart stops beating or you stop breathing. If a DNR or DNAR order has not been made and shared, a health care provider will try to help any patient whose heart has stopped or who has stopped breathing. If you plan to have surgery, talk with your health care provider about how your DNR or DNAR order will be followed if problems occur.  What if I do not have an advance directive?  Some states assign family decision makers to act on your behalf if you do not have an advance directive. Each state has its own laws about advance directives. You may want to check with your health care provider, , or state representative about the laws in your state.  Summary  Advance directives are legal documents that allow you to make decisions about your health care and medical treatment in case you become unable to communicate for yourself.  The process of discussing and writing advance directives should happen over time. You can change and update advance directives at any time.  Advance directives may include a medical power of , a living will, and a DNR or DNAR order.  This information is not intended to replace advice given to you by your health care provider. Make sure you discuss any questions you have with  your health care provider.  Document Revised: 09/21/2021 Document Reviewed: 09/21/2021  Elsevier Patient Education © 2024 [employer]  Advance Directive    Advance directives are legal documents that allow you to make decisions about your health care and medical treatment in case you become unable to communicate for yourself. Advance directives let your wishes be known to family, friends, and health care providers.  Discussing and writing advance directives should happen over time rather than all at once. Advance directives can be changed and updated at any time. There are different types of advance directives, such as:  Medical power of .  Living will.  Do not resuscitate (DNR) order or do not attempt resuscitation (DNAR) order.  Health care proxy and medical power of   A health care proxy is also called a health care agent. This person is appointed to make medical decisions for you when you are unable to make decisions for yourself. Generally, people ask a trusted friend or family member to act as their proxy and represent their preferences. Make sure you have an agreement with your trusted person to act as your proxy. A proxy may have to make a medical decision on your behalf if your wishes are not known.  A medical power of , also called a durable power of  for health care, is a legal document that names your health care proxy. Depending on the laws in your state, the document may need to be:  Signed.  Notarized.  Dated.  Copied.  Witnessed.  Incorporated into your medical record.  You may also want to appoint a trusted person to manage your money in the event you are unable to do so. This is called a durable power of  for finances. It is a separate legal document from the durable power of  for health care. You may choose your health care proxy or someone different to act as your agent in money matters.  If you do not appoint a proxy, or there is a concern that  the proxy is not acting in your best interest, a court may appoint a guardian to act on your behalf.  Living will  A living will is a set of instructions that state your wishes about medical care when you cannot express them yourself. Health care providers should keep a copy of your living will in your medical record. You may want to give a copy to family members or friends. To alert caregivers in case of an emergency, you can place a card in your wallet to let them know that you have a living will and where they can find it. A living will is used if you become:  Terminally ill.  Disabled.  Unable to communicate or make decisions.  The following decisions should be included in your living will:  To use or not to use life support equipment, such as dialysis machines and breathing machines (ventilators).  Whether you want a DNR or DNAR order. This tells health care providers not to use cardiopulmonary resuscitation (CPR) if breathing or heartbeat stops.  To use or not to use tube feeding.  To be given or not to be given food and fluids.  Whether you want comfort (palliative) care when the goal becomes comfort rather than a cure.  Whether you want to donate your organs and tissues.  A living will does not give instructions for distributing your money and property if you should pass away.  DNR or DNAR  A DNR or DNAR order is a request not to have CPR in the event that your heart stops beating or you stop breathing. If a DNR or DNAR order has not been made and shared, a health care provider will try to help any patient whose heart has stopped or who has stopped breathing. If you plan to have surgery, talk with your health care provider about how your DNR or DNAR order will be followed if problems occur.  What if I do not have an advance directive?  Some states assign family decision makers to act on your behalf if you do not have an advance directive. Each state has its own laws about advance directives. You may want to  check with your health care provider, , or state representative about the laws in your state.  Summary  Advance directives are legal documents that allow you to make decisions about your health care and medical treatment in case you become unable to communicate for yourself.  The process of discussing and writing advance directives should happen over time. You can change and update advance directives at any time.  Advance directives may include a medical power of , a living will, and a DNR or DNAR order.  This information is not intended to replace advice given to you by your health care provider. Make sure you discuss any questions you have with your health care provider.  Document Revised: 2021 Document Reviewed: 2021  [employer] Patient Education ©  [employer] Inc[employer]    Medicare Wellness  Personal Prevention Plan of Service     Date of Office Visit:    Encounter Provider:  Agueda Scott PA-C  Place of Service:  [facility]  Patient Name: Miriam Godoy  :  1960    As part of the Medicare Wellness portion of your visit today, we are providing you with this personalized preventive plan of services (PPPS). This plan is based upon recommendations of the [place] Preventive Services Task Force (USPSTF) and the Advisory Committee on Immunization Practices (ACIP).    This lists the preventive care services that should be considered, and provides dates of when you are due. Items listed as completed are up-to-date and do not require any further intervention.    Health Maintenance   Topic Date Due    ZOSTER VACCINE (1 of 2) Never done    ANNUAL WELLNESS VISIT  Never done    PAP SMEAR  Never done    Pneumococcal Vaccine 65+ (1 of 1 - PCV) Never done    LIPID PANEL  2024    COVID-19 Vaccine (4 - -24 season) 2024 (Originally 2023)    RSV Vaccine - Adults (1 - 1-dose 60+ series) 2025 (Originally 2018)    INFLUENZA  VACCINE  08/01/2024    DXA SCAN  01/23/2025    TDAP/TD VACCINES (2 - Td or Tdap) 01/05/2026    MAMMOGRAM  02/28/2026    COLORECTAL CANCER SCREENING  12/15/2033    HEPATITIS C SCREENING  Completed       No orders of the defined types were placed in this encounter.      No follow-ups on file.            An After Visit Summary and PPPS with all of these plans were given to the patient.

## 2024-04-19 ENCOUNTER — PRIOR AUTHORIZATION (OUTPATIENT)
Dept: INTERNAL MEDICINE | Facility: CLINIC | Age: 64
End: 2024-04-19
Payer: MEDICARE

## 2024-04-22 ENCOUNTER — TELEPHONE (OUTPATIENT)
Dept: INTERNAL MEDICINE | Facility: CLINIC | Age: 64
End: 2024-04-22
Payer: MEDICARE

## 2024-04-22 NOTE — TELEPHONE ENCOUNTER
Caller: Miriam Godoy    Relationship: Self    Best call back number: [phone]     What is the best time to reach you: ANY    Who are you requesting to speak with (clinical staff, provider,  specific staff member): CLINICAL     What was the call regarding:   Patient stated that the new  medication that was prescribed a couple days ago  on 4/18/24 is not covered by her insurance and is requesting an alternative. Please call to discuss.

## 2024-04-22 NOTE — TELEPHONE ENCOUNTER
CALLED PHARMACY AND STATED CASH PRICE IS $97.54 AND stated goodrx wont cover due to being controlled.

## 2024-04-25 DIAGNOSIS — F41.9 ANXIETY: Primary | ICD-10-CM

## 2024-04-25 RX ORDER — LORAZEPAM 0.5 MG/1
0.5 TABLET ORAL NIGHTLY PRN
Qty: 20 TABLET | Refills: 0 | Status: SHIPPED | OUTPATIENT
Start: 2024-04-25

## 2024-07-25 ENCOUNTER — OFFICE VISIT (OUTPATIENT)
Dept: INTERNAL MEDICINE | Facility: CLINIC | Age: 64
End: 2024-07-25
Payer: MEDICARE

## 2024-07-25 VITALS
TEMPERATURE: 98.4 F | HEART RATE: 66 BPM | HEIGHT: 64 IN | DIASTOLIC BLOOD PRESSURE: 56 MMHG | WEIGHT: 112 LBS | SYSTOLIC BLOOD PRESSURE: 102 MMHG | BODY MASS INDEX: 19.12 KG/M2

## 2024-07-25 DIAGNOSIS — L29.9 ITCHING: ICD-10-CM

## 2024-07-25 DIAGNOSIS — J30.9 CHRONIC ALLERGIC RHINITIS: Primary | ICD-10-CM

## 2024-07-25 DIAGNOSIS — R21 RASH: ICD-10-CM

## 2024-07-25 PROCEDURE — 1126F AMNT PAIN NOTED NONE PRSNT: CPT | Performed by: INTERNAL MEDICINE

## 2024-07-25 PROCEDURE — 1160F RVW MEDS BY RX/DR IN RCRD: CPT | Performed by: INTERNAL MEDICINE

## 2024-07-25 PROCEDURE — 1159F MED LIST DOCD IN RCRD: CPT | Performed by: INTERNAL MEDICINE

## 2024-07-25 PROCEDURE — 99213 OFFICE O/P EST LOW 20 MIN: CPT | Performed by: INTERNAL MEDICINE

## 2024-07-25 NOTE — PROGRESS NOTES
"Chief Complaint   Patient presents with    Insect Bite     everywhere       History of Present Illness  63 y.o. female presents for evaluation of some scattered rash that itches at times. She has a few right shoulder and under arm and left arm and right abdomen and for legs     Review of Systems  .    PMSFH:  The following portions of the patient's history were reviewed and updated as appropriate: allergies, current medications, past family history, past medical history, past social history, past surgical history and problem list.      Current Outpatient Medications:     BIOTIN PO, Take 1 tablet by mouth Daily., Disp: , Rfl:     Cholecalciferol (VITAMIN D-3) 5000 units tablet, Take 1 tablet by mouth Daily., Disp: , Rfl:     fluticasone (FLONASE) 50 MCG/ACT nasal spray, 2 sprays into the nostril(s) as directed by provider Daily., Disp: 18.2 mL, Rfl: 1    LORazepam (Ativan) 0.5 MG tablet, Take 1 tablet by mouth At Night As Needed for Anxiety., Disp: 20 tablet, Rfl: 0    Multiple Vitamins-Minerals (MULTIVITAMIN ADULT PO), Take 1 tablet by mouth Daily., Disp: , Rfl:     Omega-3 Fatty Acids (FISH OIL) 1000 MG capsule capsule, Take 1 capsule by mouth Daily With Breakfast., Disp: , Rfl:     Probiotic Product (PROBIOTIC DAILY PO), Take 1 tablet by mouth Daily., Disp: , Rfl:     traZODone (DESYREL) 50 MG tablet, Take 1 tablet by mouth Every Night., Disp: , Rfl:     VITALS:  /56   Pulse 66   Temp 98.4 °F (36.9 °C)   Ht 162.6 cm (64.02\")   Wt 50.8 kg (112 lb)   BMI 19.22 kg/m²     Physical Exam  Constitutional:       Appearance: Normal appearance.   Skin:     Comments: Mild erythematous nodules without drainage or streaking  scattered rash that itches at times. She has a few right shoulder and under arm and left arm and right abdomen and for legs    Neurological:      General: No focal deficit present.      Mental Status: She is alert and oriented to person, place, and time.   Psychiatric:         Mood and Affect: " Mood normal.         Behavior: Behavior normal.         LABS:    CMP:  Lab Results   Component Value Date    BUN 23 03/19/2024    CREATININE 0.97 03/19/2024    EGFRIFNONA 74 01/14/2022     03/19/2024    K 4.1 03/19/2024     03/19/2024    CALCIUM 10.0 03/19/2024    ALBUMIN 4.4 03/19/2024    BILITOT 0.3 03/19/2024    ALKPHOS 90 03/19/2024    AST 26 03/19/2024    ALT 18 03/19/2024     CBC:  Lab Results   Component Value Date    WBC 6.51 03/19/2024    RBC 4.67 03/19/2024    HGB 14.3 03/19/2024    HCT 43.7 03/19/2024    MCV 93.6 03/19/2024    MCH 30.6 03/19/2024    MCHC 32.7 03/19/2024    RDW 12.8 03/19/2024     03/19/2024     Procedures         ASSESSMENT/PLAN:  Diagnoses and all orders for this visit:    1. Chronic allergic rhinitis (Primary)  Assessment & Plan:  She has underlying allergies which likely makes the presumed bites a bit worse. Use benadryl at night and can use zyrtec if worsens.       2. Itching  Comments:  She has underlying allergies which likely makes the presumed bites a bit worse. Use benadryl at night and can use zyrtec if worsens.    3. Rash  Assessment & Plan:  Likely insect bite elated to being outside. I explained did not think fleas           FOLLOW-UP:  Return for Next scheduled follow up.      Electronically signed by:    Juvenal Jose MD

## 2024-07-25 NOTE — ASSESSMENT & PLAN NOTE
She has underlying allergies which likely makes the presumed bites a bit worse. Use benadryl at night and can use zyrtec if worsens.

## 2024-08-12 ENCOUNTER — TELEPHONE (OUTPATIENT)
Dept: INTERNAL MEDICINE | Facility: CLINIC | Age: 64
End: 2024-08-12
Payer: MEDICARE

## 2024-08-12 NOTE — TELEPHONE ENCOUNTER
Caller: Walt Miriam Singleton    Relationship: Self    Best call back number: [phone]      Additional notes: PATIENT STATES DURING A RECENT APPOINTMENT WITH PCP SHE WAS TOLD ABOUT A MEDICATION OVER THE COUNTER SHE COULD TAKE TO HELP THE CRACKLING IN HER LUNGS AT NIGHT. PATIENT WOULD LIKE TO ASK WHAT THE NAME OF THE MEDICATION WAS.

## 2024-08-13 NOTE — TELEPHONE ENCOUNTER
LVM FOR PT TO RETURN PHONE CALL.    HUB TO READ:  It sounded like could be post nasal drip from allergies and could try allegra 180 mg or zyrtec 10 mg but if does not work would need to return here to see her primary doctor or extender for further evaluation

## 2024-08-14 NOTE — TELEPHONE ENCOUNTER
Left voicemail for patient to return call.      HUB TO READ:  It sounded like could be post nasal drip from allergies and could try allegra 180 mg or zyrtec 10 mg but if does not work would need to return here to see her primary doctor or extender for further evaluation

## 2024-09-23 ENCOUNTER — TELEPHONE (OUTPATIENT)
Dept: INTERNAL MEDICINE | Facility: CLINIC | Age: 64
End: 2024-09-23

## 2024-09-23 NOTE — TELEPHONE ENCOUNTER
Caller: Walt Miriam Singleton    Relationship: Self    Best call back number: [phone]     What is the best time to reach you: ANYTIME     Who are you requesting to speak with (clinical staff, provider,  specific staff member): CLINICAL STAFF    What was the call regarding: PATIENT STATES THAT SHE FOUND A SPOT ON HER BREAST THAT IS CONCERNING TO HER. SHE WOULD LIKE TO HAVE AN ORDER FOR A DIAGNOSTIC MAMMOGRAM.     Is it okay if the provider responds through [facility]: YES

## 2024-09-25 NOTE — TELEPHONE ENCOUNTER
Spoke to pt. She verbalized understanding. She says she is not worried about it right now. There is no pain or discomfort at the spot. She will be gone next week anyway.

## 2024-10-18 ENCOUNTER — HOSPITAL ENCOUNTER (OUTPATIENT)
Dept: GENERAL RADIOLOGY | Facility: HOSPITAL | Age: 64
Discharge: HOME | End: 2024-10-18
Payer: MEDICARE

## 2024-10-18 ENCOUNTER — OFFICE VISIT (OUTPATIENT)
Dept: INTERNAL MEDICINE | Facility: CLINIC | Age: 64
End: 2024-10-18
Payer: MEDICARE

## 2024-10-18 ENCOUNTER — LAB (OUTPATIENT)
Dept: LAB | Facility: HOSPITAL | Age: 64
End: 2024-10-18
Payer: MEDICARE

## 2024-10-18 VITALS
SYSTOLIC BLOOD PRESSURE: 98 MMHG | WEIGHT: 115.2 LBS | HEART RATE: 57 BPM | TEMPERATURE: 98.4 F | DIASTOLIC BLOOD PRESSURE: 62 MMHG | OXYGEN SATURATION: 97 % | BODY MASS INDEX: 19.67 KG/M2 | HEIGHT: 64 IN

## 2024-10-18 DIAGNOSIS — E55.9 VITAMIN D DEFICIENCY: ICD-10-CM

## 2024-10-18 DIAGNOSIS — F41.9 ANXIETY: ICD-10-CM

## 2024-10-18 DIAGNOSIS — Z87.891 HISTORY OF TOBACCO ABUSE: ICD-10-CM

## 2024-10-18 DIAGNOSIS — E78.2 MIXED HYPERLIPIDEMIA: ICD-10-CM

## 2024-10-18 DIAGNOSIS — R06.2 WHEEZING: Primary | ICD-10-CM

## 2024-10-18 DIAGNOSIS — R06.2 WHEEZING: ICD-10-CM

## 2024-10-18 LAB
BACTERIA UR QL AUTO: NORMAL /HPF
HYALINE CASTS UR QL AUTO: NORMAL /LPF
RBC # UR STRIP: NORMAL /HPF
REF LAB TEST METHOD: NORMAL
SQUAMOUS #/AREA URNS HPF: NORMAL /HPF
WBC # UR STRIP: NORMAL /HPF

## 2024-10-18 PROCEDURE — 1126F AMNT PAIN NOTED NONE PRSNT: CPT | Performed by: PHYSICIAN ASSISTANT

## 2024-10-18 PROCEDURE — 71046 X-RAY EXAM CHEST 2 VIEWS: CPT

## 2024-10-18 PROCEDURE — 81015 MICROSCOPIC EXAM OF URINE: CPT

## 2024-10-18 PROCEDURE — 99214 OFFICE O/P EST MOD 30 MIN: CPT | Performed by: PHYSICIAN ASSISTANT

## 2024-10-18 NOTE — PROGRESS NOTES
[facility]    Miriam Godoy  1960   [id number]      Patient Care Team:  Agueda Scott PA-C as PCP - General (Physician Assistant)    Chief Complaint::   Chief Complaint   Patient presents with    Vitamin D Deficiency     6 months follow-up          HPI    Miriam Godoy is a 63 year old female who presents today for 6-month follow-up.  Past medical history significant for history of PVCs, chronic allergic rhinitis, vitamin D deficiency, and anxiety.  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  She retired early to bring this to a wider audience.  She wanted to teach this to teachers and it is called -[employer]. She quit last year due to ulcer, increased stress.   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   She has moved to [address] in [place].  Significant improvement in quality of life.  She has been doing yoga twice a week, weight training 2 days a week, walks dog 3-4 times a week.  She has a carton summer and fall.  She takes MV, biotin, probiotic, corsetin, vitamin D, magnesium, fish oil.    She has had nighttime wheezing x 1 month.  Reports some congestion.  History of tobacco abuse.  Denies body aches or fevers.    Patient Active Problem List   Diagnosis    Premature ventricular contractions (PVCs) (VPCs)    Rash    Chronic allergic rhinitis    Internal nasal lesion    Hordeolum internum    Annual physical exam    Acute left-sided low back pain without sciatica    Mixed hyperlipidemia    COVID-19 virus infection    Right lower quadrant pain    Other fatigue    Vitamin D deficiency    Iron deficiency anemia    Duodenal ulcer    Medicare annual wellness visit, initial    Anxiety    Wheezing    History of tobacco abuse        Past Medical History:   Diagnosis Date    Annual physical exam 04/15/2019    Aortic root dilatation     Atypical chest  pain     History of echocardiogram 2015    showing estimated EF 55% to 60% with mild aortic regurgitation and moderate dilatation of the aortic root    History of EKG     normal    History of palpitations     Osteopenia        Past Surgical History:   Procedure Laterality Date    APPENDECTOMY      OOPHORECTOMY Bilateral        Family History   Problem Relation Age of Onset    Breast cancer Mother 43    Breast cancer Paternal Grandmother 47    Coronary artery disease Father 57        premature    Heart disease Father         Had heart surgery and congestive heart disease    Ovarian cancer Neg Hx        Social History     Socioeconomic History    Marital status:    Tobacco Use    Smoking status: Former     Current packs/day: 0.00     Average packs/day: 0.5 packs/day for 9.0 years (4.5 ttl pk-yrs)     Types: Cigarettes     Start date: 1972     Quit date: 1981     Years since quittin.8    Smokeless tobacco: Never   Vaping Use    Vaping status: Never Used   Substance and Sexual Activity    Alcohol use: Not Currently     Comment: rare    Drug use: No    Sexual activity: Yes     Partners: Male     Birth control/protection: Post-menopausal       Allergies   Allergen Reactions    Penicillins Rash       Review of Systems   Constitutional:  Negative for activity change, appetite change, diaphoresis, fatigue, unexpected weight gain and unexpected weight loss.   HENT:  Negative for hearing loss.    Eyes:  Negative for visual disturbance.   Respiratory:  Positive for wheezing. Negative for chest tightness and shortness of breath.    Cardiovascular:  Negative for chest pain, palpitations and leg swelling.   Gastrointestinal:  Negative for abdominal pain, blood in stool, GERD and indigestion.   Endocrine: Negative for cold intolerance and heat intolerance.   Genitourinary:  Negative for dysuria and hematuria.   Musculoskeletal:  Negative for arthralgias and myalgias.   Skin:  Negative for skin lesions.  "  Neurological:  Negative for tremors, seizures, syncope, speech difficulty, weakness, headache, memory problem and confusion.   Hematological:  Does not bruise/bleed easily.   Psychiatric/Behavioral:  Negative for sleep disturbance and depressed mood. The patient is not nervous/anxious.         Vital Signs  Vitals:    10/18/24 1112   BP: 98/62   BP Location: Left arm   Patient Position: Sitting   Cuff Size: Adult   Pulse: 57   Temp: 98.4 °F (36.9 °C)   TempSrc: Infrared   SpO2: 97%   Weight: 52.3 kg (115 lb 3.2 oz)   Height: 162.6 cm (64.02\")   PainSc: 0-No pain     Body mass index is 19.76 kg/m².  BMI is within normal parameters. No other follow-up for BMI required.     Advance Care Planning   ACP discussion was held with the patient during this visit. Patient does not have an advance directive, information provided.       Current Outpatient Medications:     BIOTIN PO, Take 1 tablet by mouth Daily., Disp: , Rfl:     Cholecalciferol (VITAMIN D-3) 5000 units tablet, Take 1 tablet by mouth Daily., Disp: , Rfl:     fluticasone (FLONASE) 50 MCG/ACT nasal spray, 2 sprays into the nostril(s) as directed by provider Daily., Disp: 18.2 mL, Rfl: 1    Multiple Vitamins-Minerals (MULTIVITAMIN ADULT PO), Take 1 tablet by mouth Daily., Disp: , Rfl:     Omega-3 Fatty Acids (FISH OIL) 1000 MG capsule capsule, Take 1 capsule by mouth Daily With Breakfast., Disp: , Rfl:     Probiotic Product (PROBIOTIC DAILY PO), Take 1 tablet by mouth Daily., Disp: , Rfl:     Physical Exam  Vitals reviewed.   Constitutional:       Appearance: Normal appearance. She is well-developed.   HENT:      Head: Normocephalic and atraumatic.      Right Ear: Hearing, tympanic membrane, ear canal and external ear normal.      Left Ear: Hearing, tympanic membrane, ear canal and external ear normal.      Nose: Nose normal.      Mouth/Throat:      Pharynx: Uvula midline.   Eyes:      General: Lids are normal.      Conjunctiva/sclera: Conjunctivae normal.      " Pupils: Pupils are equal, round, and reactive to light.   Cardiovascular:      Rate and Rhythm: Normal rate and regular rhythm.      Heart sounds: Normal heart sounds.   Pulmonary:      Effort: Pulmonary effort is normal.      Breath sounds: No wheezing.   Abdominal:      General: Bowel sounds are normal.      Palpations: Abdomen is soft.   Musculoskeletal:         General: Normal range of motion.      Cervical back: Full passive range of motion without pain, normal range of motion and neck supple.   Skin:     General: Skin is warm and dry.   Neurological:      Mental Status: She is alert and oriented to person, place, and time.      Deep Tendon Reflexes: Reflexes are normal and symmetric.   Psychiatric:         Speech: Speech normal.         Behavior: Behavior normal.         Thought Content: Thought content normal.         Judgment: Judgment normal.          ACE III MINI            Results Review:    No results found for this or any previous visit (from the past 4 weeks).  Procedures    Medication Review: Medications reviewed and noted    Social History     Socioeconomic History    Marital status:    Tobacco Use    Smoking status: Former     Current packs/day: 0.00     Average packs/day: 0.5 packs/day for 9.0 years (4.5 ttl pk-yrs)     Types: Cigarettes     Start date: 1972     Quit date: 1981     Years since quittin.8    Smokeless tobacco: Never   Vaping Use    Vaping status: Never Used   Substance and Sexual Activity    Alcohol use: Not Currently     Comment: rare    Drug use: No    Sexual activity: Yes     Partners: Male     Birth control/protection: Post-menopausal        Assessment/Plan:    Diagnoses and all orders for this visit:    1. Wheezing (Primary)  -     XR Chest PA & Lateral; Future    2. History of tobacco abuse  -     Urinalysis, Microscopic Only - Urine, Clean Catch; Future    3. Mixed hyperlipidemia  Overview:  Continue to eat a low-fat low-cholesterol diet.      4.  Anxiety  Overview:  Improved [place] country.  Continue heart healthy diet and regular cardiovascular exercise.      5. Vitamin D deficiency  Overview:  Currently taking vitamin D 5000 units daily.      Due to her history of cigarette smoking, will check chest x-ray today.    Plan of care reviewed with patient at the conclusion of today's visit. Education was provided regarding diagnosis, management, and any prescribed or recommended OTC medications.Patient verbalizes understanding of and agreement with management plan.       I have seen Miriam on this date of service:preparing for the visit, reviewing tests, obtaining and/or reviewing a separately obtained history, performing a medically appropriate examination and/or evaluation , counseling and educating the patient/family/caregiver, ordering medications, tests, or procedures, referring and communicating with other health care professionals , and documenting information in the medical record    Agueda Scott PA-C      Note: Part of this note may be an electronic transcription/translation of spoken language to printed text using the Dragon Dictation system.

## 2025-01-14 ENCOUNTER — LAB (OUTPATIENT)
Dept: LAB | Facility: HOSPITAL | Age: 65
End: 2025-01-14
Payer: MEDICARE

## 2025-01-14 ENCOUNTER — OFFICE VISIT (OUTPATIENT)
Dept: INTERNAL MEDICINE | Facility: CLINIC | Age: 65
End: 2025-01-14
Payer: MEDICARE

## 2025-01-14 VITALS
OXYGEN SATURATION: 99 % | HEIGHT: 64 IN | WEIGHT: 114.4 LBS | BODY MASS INDEX: 19.53 KG/M2 | HEART RATE: 61 BPM | TEMPERATURE: 98 F | SYSTOLIC BLOOD PRESSURE: 104 MMHG | DIASTOLIC BLOOD PRESSURE: 66 MMHG

## 2025-01-14 DIAGNOSIS — E55.9 VITAMIN D DEFICIENCY: ICD-10-CM

## 2025-01-14 DIAGNOSIS — E78.2 MIXED HYPERLIPIDEMIA: ICD-10-CM

## 2025-01-14 DIAGNOSIS — Z13.29 THYROID DISORDER SCREENING: ICD-10-CM

## 2025-01-14 DIAGNOSIS — N64.4 BREAST PAIN, LEFT: Primary | ICD-10-CM

## 2025-01-14 DIAGNOSIS — R73.09 ABNORMAL GLUCOSE: ICD-10-CM

## 2025-01-14 DIAGNOSIS — Z13.21 ENCOUNTER FOR VITAMIN DEFICIENCY SCREENING: ICD-10-CM

## 2025-01-14 DIAGNOSIS — D50.8 OTHER IRON DEFICIENCY ANEMIA: ICD-10-CM

## 2025-01-14 DIAGNOSIS — Z78.0 POSTMENOPAUSAL: Primary | ICD-10-CM

## 2025-01-14 LAB
25(OH)D3 SERPL-MCNC: 50.3 NG/ML (ref 30–100)
ALBUMIN SERPL-MCNC: 4.5 G/DL (ref 3.5–5.2)
ALBUMIN/GLOB SERPL: 1.5 G/DL
ALP SERPL-CCNC: 100 U/L (ref 39–117)
ALT SERPL W P-5'-P-CCNC: 20 U/L (ref 1–33)
ANION GAP SERPL CALCULATED.3IONS-SCNC: 8 MMOL/L (ref 5–15)
AST SERPL-CCNC: 26 U/L (ref 1–32)
BASOPHILS # BLD AUTO: 0.04 10*3/MM3 (ref 0–0.2)
BASOPHILS NFR BLD AUTO: 0.8 % (ref 0–1.5)
BILIRUB SERPL-MCNC: 0.5 MG/DL (ref 0–1.2)
BUN SERPL-MCNC: 21 MG/DL (ref 8–23)
BUN/CREAT SERPL: 26.6 (ref 7–25)
CALCIUM SPEC-SCNC: 10.2 MG/DL (ref 8.6–10.5)
CHLORIDE SERPL-SCNC: 98 MMOL/L (ref 98–107)
CHOLEST SERPL-MCNC: 204 MG/DL (ref 0–200)
CO2 SERPL-SCNC: 31 MMOL/L (ref 22–29)
CREAT SERPL-MCNC: 0.79 MG/DL (ref 0.57–1)
DEPRECATED RDW RBC AUTO: 41.8 FL (ref 37–54)
EGFRCR SERPLBLD CKD-EPI 2021: 82.6 ML/MIN/1.73
EOSINOPHIL # BLD AUTO: 0.07 10*3/MM3 (ref 0–0.4)
EOSINOPHIL NFR BLD AUTO: 1.3 % (ref 0.3–6.2)
ERYTHROCYTE [DISTWIDTH] IN BLOOD BY AUTOMATED COUNT: 12.3 % (ref 12.3–15.4)
GLOBULIN UR ELPH-MCNC: 3 GM/DL
GLUCOSE SERPL-MCNC: 93 MG/DL (ref 65–99)
HBA1C MFR BLD: 5.6 % (ref 4.8–5.6)
HCT VFR BLD AUTO: 43.1 % (ref 34–46.6)
HDLC SERPL-MCNC: 80 MG/DL (ref 40–60)
HGB BLD-MCNC: 14.6 G/DL (ref 12–15.9)
IMM GRANULOCYTES # BLD AUTO: 0.01 10*3/MM3 (ref 0–0.05)
IMM GRANULOCYTES NFR BLD AUTO: 0.2 % (ref 0–0.5)
IRON 24H UR-MRATE: 174 MCG/DL (ref 37–145)
IRON SATN MFR SERPL: 46 % (ref 20–50)
LDLC SERPL CALC-MCNC: 117 MG/DL (ref 0–100)
LDLC/HDLC SERPL: 1.45 {RATIO}
LYMPHOCYTES # BLD AUTO: 1.77 10*3/MM3 (ref 0.7–3.1)
LYMPHOCYTES NFR BLD AUTO: 33.6 % (ref 19.6–45.3)
MCH RBC QN AUTO: 31.4 PG (ref 26.6–33)
MCHC RBC AUTO-ENTMCNC: 33.9 G/DL (ref 31.5–35.7)
MCV RBC AUTO: 92.7 FL (ref 79–97)
MONOCYTES # BLD AUTO: 0.4 10*3/MM3 (ref 0.1–0.9)
MONOCYTES NFR BLD AUTO: 7.6 % (ref 5–12)
NEUTROPHILS NFR BLD AUTO: 2.98 10*3/MM3 (ref 1.7–7)
NEUTROPHILS NFR BLD AUTO: 56.5 % (ref 42.7–76)
NRBC BLD AUTO-RTO: 0 /100 WBC (ref 0–0.2)
PLATELET # BLD AUTO: 233 10*3/MM3 (ref 140–450)
PMV BLD AUTO: 10.6 FL (ref 6–12)
POTASSIUM SERPL-SCNC: 4.5 MMOL/L (ref 3.5–5.2)
PROT SERPL-MCNC: 7.5 G/DL (ref 6–8.5)
RBC # BLD AUTO: 4.65 10*6/MM3 (ref 3.77–5.28)
SODIUM SERPL-SCNC: 137 MMOL/L (ref 136–145)
T3FREE SERPL-MCNC: 2.68 PG/ML (ref 2–4.4)
T4 FREE SERPL-MCNC: 1.17 NG/DL (ref 0.92–1.68)
TIBC SERPL-MCNC: 377 MCG/DL (ref 298–536)
TRANSFERRIN SERPL-MCNC: 253 MG/DL (ref 200–360)
TRIGL SERPL-MCNC: 39 MG/DL (ref 0–150)
TSH SERPL DL<=0.05 MIU/L-ACNC: 1.7 UIU/ML (ref 0.27–4.2)
VIT B12 BLD-MCNC: 764 PG/ML (ref 211–946)
VLDLC SERPL-MCNC: 7 MG/DL (ref 5–40)
WBC NRBC COR # BLD AUTO: 5.27 10*3/MM3 (ref 3.4–10.8)

## 2025-01-14 PROCEDURE — 80061 LIPID PANEL: CPT

## 2025-01-14 PROCEDURE — 84439 ASSAY OF FREE THYROXINE: CPT

## 2025-01-14 PROCEDURE — 82306 VITAMIN D 25 HYDROXY: CPT

## 2025-01-14 PROCEDURE — 80053 COMPREHEN METABOLIC PANEL: CPT

## 2025-01-14 PROCEDURE — 85025 COMPLETE CBC W/AUTO DIFF WBC: CPT

## 2025-01-14 PROCEDURE — 83540 ASSAY OF IRON: CPT

## 2025-01-14 PROCEDURE — 84481 FREE ASSAY (FT-3): CPT

## 2025-01-14 PROCEDURE — 83036 HEMOGLOBIN GLYCOSYLATED A1C: CPT

## 2025-01-14 PROCEDURE — 84466 ASSAY OF TRANSFERRIN: CPT

## 2025-01-14 PROCEDURE — 82607 VITAMIN B-12: CPT

## 2025-01-14 PROCEDURE — 84443 ASSAY THYROID STIM HORMONE: CPT

## 2025-01-14 NOTE — PROGRESS NOTES
Acute Office Visit      Name: Miriam Godoy    : 1960     MRN: [id number]   Care Team: Patient Care Team:  Agueda Scott PA-C as PCP - General (Physician Assistant)    Chief Complaint  Breast Mass (left)    Subjective     History of Present Illness  The patient presents for left breast pain and borderline A1C.    She reports engaging in restorative poses, typically utilizing a pillow for support. Approximately 1 to 2 months ago, she experienced discomfort in an unfamiliar area of her left breast while performing these exercises without the usual pillow. The pain was particularly noticeable during periods of stress over the holidays, even in the absence of direct pressure on the area. Although the pain has since subsided, she perceives a slight reduction in the size of a previously noted lump. She has no prior history of issues with her left breast. She recalls undergoing a thorough examination and ultrasound in the past. She conducts self-breast exams irregularly and reports increased sensitivity when lying down or raising her arm. She is uncertain about the presence of additional tissue in her left breast.    She did not get her blood work done the last time and her A1c was borderline before and she wants to get it checked out. She has not had anything to eat this morning. She usually eats super healthy.    FAMILY HISTORY  She has a family history of breast cancer on both sides.         Past Medical History:   Diagnosis Date    Annual physical exam 04/15/2019    Aortic root dilatation     Atypical chest pain     History of echocardiogram 2015    showing estimated EF 55% to 60% with mild aortic regurgitation and moderate dilatation of the aortic root    History of EKG     normal    History of palpitations     Osteopenia        Past Surgical History:   Procedure Laterality Date    APPENDECTOMY      OOPHORECTOMY Bilateral        Social History     Socioeconomic History    Marital status:  "   Tobacco Use    Smoking status: Former     Current packs/day: 0.00     Average packs/day: 0.5 packs/day for 9.0 years (4.5 ttl pk-yrs)     Types: Cigarettes     Start date: 1972     Quit date: 1981     Years since quittin.0    Smokeless tobacco: Never   Vaping Use    Vaping status: Never Used   Substance and Sexual Activity    Alcohol use: Not Currently     Comment: rare    Drug use: No    Sexual activity: Yes     Partners: Male     Birth control/protection: Post-menopausal         Current Outpatient Medications:     BIOTIN PO, Take 1 tablet by mouth Daily., Disp: , Rfl:     Cholecalciferol (VITAMIN D-3) 5000 units tablet, Take 1 tablet by mouth Daily., Disp: , Rfl:     fluticasone (FLONASE) 50 MCG/ACT nasal spray, 2 sprays into the nostril(s) as directed by provider Daily., Disp: 18.2 mL, Rfl: 1    Multiple Vitamins-Minerals (MULTIVITAMIN ADULT PO), Take 1 tablet by mouth Daily., Disp: , Rfl:     Omega-3 Fatty Acids (FISH OIL) 1000 MG capsule capsule, Take 1 capsule by mouth Daily With Breakfast., Disp: , Rfl:     Probiotic Product (PROBIOTIC DAILY PO), Take 1 tablet by mouth Daily., Disp: , Rfl:     PHQ-9 Total Score:      Objective     Vital Signs  /66 (BP Location: Left arm, Patient Position: Sitting, Cuff Size: Adult)   Pulse 61   Temp 98 °F (36.7 °C) (Temporal)   Ht 162.6 cm (64.02\")   Wt 51.9 kg (114 lb 6.4 oz)   SpO2 99%   BMI 19.63 kg/m²   Estimated body mass index is 19.63 kg/m² as calculated from the following:    Height as of this encounter: 162.6 cm (64.02\").    Weight as of this encounter: 51.9 kg (114 lb 6.4 oz).    BMI is within normal parameters. No other follow-up for BMI required.      Physical Exam   Physical Exam  No abnormalities detected in the right breast. Tenderness noted in the left breast at the 2 to 3 o'clock position, 3 to 4 cm from the nipple.       Results  Imaging  Last mammogram conducted on 2024, was classified as BI-RADS 1, indicating no " significant findings of concern.     Procedures    Assessment and Plan     Assessment/Plan:  Diagnoses and all orders for this visit:    1. Breast pain, left (Primary)  -     Mammo Diagnostic Digital Tomosynthesis Bilateral With CAD; Future    2. Vitamin D deficiency  -     Vitamin D,25-Hydroxy; Future    3. Mixed hyperlipidemia  -     CBC & Differential; Future  -     Comprehensive Metabolic Panel; Future  -     Lipid Panel; Future    4. Other iron deficiency anemia    5. Abnormal glucose  -     Hemoglobin A1c; Future    6. Thyroid disorder screening  -     TSH; Future  -     T4, Free; Future  -     T3, Free; Future    7. Encounter for vitamin deficiency screening  -     Vitamin B12; Future  -     Iron Profile; Future       Assessment & Plan  1. Left breast pain.  The patient reports left breast pain that began a couple of months ago, exacerbated during the holidays, and is now associated with a lump. She has a family history of breast cancer on both sides. Physical examination revealed tenderness at the 2-3 o'clock position, 3-4 cm from the nipple. A bilateral diagnostic mammogram and breast ultrasound have been ordered to further evaluate the lump and pain. She will be contacted to schedule the imaging studies.    2. Borderline A1c.  The patient mentioned that her previous A1c was borderline and expressed a desire to have it rechecked. She has been fasting and is ready for blood work. A comprehensive set of labs, including A1c, will be conducted today to assess her current status.       Plan of care reviewed with patient at the conclusion of today's visit. Education was provided regarding diagnosis, management and any prescribed or recommended OTC medications.  Patient verbalizes understanding of and agreement with management plan.    Follow Up  No follow-ups on file.    Agueda Scott PA-C   Patient or patient representative verbalized consent for the use of Ambient Listening during the visit with   Agueda Scott PA-C for chart documentation. 1/14/2025  12:44 EST   Answers submitted by the patient for this visit:  Primary Reason for Visit (Submitted on 1/13/2025)  What is the primary reason for your visit?: Problem Not Listed  Problem not listed (Submitted on 1/13/2025)  Chief Complaint: Other medical problem  abdominal pain: No  anorexia: No  joint pain: No  change in stool: No  chest pain: No  chills: No  nasal congestion: No  cough: No  diaphoresis: No  fatigue: No  fever: No  headaches: No  joint swelling: No  myalgias: No  nausea: No  neck pain: No  numbness: No  rash: No  sore throat: No  swollen glands: No  dysuria: No  vertigo: No  visual change: No  vomiting: No  weakness: No  Other symptom: Concerned spot on breast  Onset: 1 to 6 months  Chronicity: new  Frequency: intermittently  Medications tried: None

## 2025-01-23 ENCOUNTER — HOSPITAL ENCOUNTER (OUTPATIENT)
Dept: ULTRASOUND IMAGING | Facility: HOSPITAL | Age: 65
Discharge: HOME | End: 2025-01-23
Payer: MEDICARE

## 2025-01-23 ENCOUNTER — HOSPITAL ENCOUNTER (OUTPATIENT)
Dept: MAMMOGRAPHY | Facility: HOSPITAL | Age: 65
Discharge: HOME | End: 2025-01-23
Payer: MEDICARE

## 2025-01-23 DIAGNOSIS — N64.4 BREAST PAIN, LEFT: ICD-10-CM

## 2025-01-23 PROCEDURE — 77066 DX MAMMO INCL CAD BI: CPT

## 2025-01-23 PROCEDURE — 76642 ULTRASOUND BREAST LIMITED: CPT

## 2025-01-23 PROCEDURE — G0279 TOMOSYNTHESIS, MAMMO: HCPCS

## 2025-04-24 ENCOUNTER — LAB (OUTPATIENT)
Dept: LAB | Facility: HOSPITAL | Age: 65
End: 2025-04-24
Payer: MEDICARE

## 2025-04-24 ENCOUNTER — OFFICE VISIT (OUTPATIENT)
Dept: INTERNAL MEDICINE | Facility: CLINIC | Age: 65
End: 2025-04-24
Payer: MEDICARE

## 2025-04-24 VITALS
TEMPERATURE: 98.6 F | HEART RATE: 72 BPM | WEIGHT: 114 LBS | SYSTOLIC BLOOD PRESSURE: 90 MMHG | DIASTOLIC BLOOD PRESSURE: 56 MMHG | BODY MASS INDEX: 19.46 KG/M2 | HEIGHT: 64 IN

## 2025-04-24 DIAGNOSIS — D50.8 OTHER IRON DEFICIENCY ANEMIA: Primary | ICD-10-CM

## 2025-04-24 DIAGNOSIS — E78.2 MIXED HYPERLIPIDEMIA: ICD-10-CM

## 2025-04-24 DIAGNOSIS — D50.8 OTHER IRON DEFICIENCY ANEMIA: ICD-10-CM

## 2025-04-24 LAB
IRON 24H UR-MRATE: 107 MCG/DL (ref 37–145)
IRON SATN MFR SERPL: 30 % (ref 20–50)
TIBC SERPL-MCNC: 362 MCG/DL (ref 298–536)
TRANSFERRIN SERPL-MCNC: 243 MG/DL (ref 200–360)

## 2025-04-24 PROCEDURE — 83540 ASSAY OF IRON: CPT

## 2025-04-24 PROCEDURE — 84466 ASSAY OF TRANSFERRIN: CPT

## 2025-04-24 RX ORDER — HYDROXYZINE PAMOATE 25 MG/1
25 CAPSULE ORAL NIGHTLY PRN
Qty: 30 CAPSULE | Refills: 2 | Status: SHIPPED | OUTPATIENT
Start: 2025-04-24

## 2025-04-24 NOTE — PROGRESS NOTES
Subjective   The ABCs of the Annual Wellness Visit  Medicare Wellness Visit      Miriam Godoy is a 64 y.o. patient who presents for a Medicare Wellness Visit.    The following portions of the patient's history were reviewed and   updated as appropriate: allergies, current medications, past family history, past medical history, past social history, past surgical history, and problem list.    Compared to one year ago, the patient's physical   health is better.  Compared to one year ago, the patient's mental   health is better.    Recent Hospitalizations:  She was not admitted to the hospital during the last year.     Current Medical Providers:  Patient Care Team:  Agueda Scott PA-C as PCP - General (Physician Assistant)    Outpatient Medications Prior to Visit   Medication Sig Dispense Refill    BIOTIN PO Take 1 tablet by mouth Daily.      Cholecalciferol (VITAMIN D-3) 5000 units tablet Take 1 tablet by mouth Daily.      Flaxseed, Linseed, (FLAXSEED OIL PO) Take 1 tablet by mouth Daily.      fluticasone (FLONASE) 50 MCG/ACT nasal spray 2 sprays into the nostril(s) as directed by provider Daily. 18.2 mL 1    Multiple Vitamins-Minerals (MULTIVITAMIN ADULT PO) Take 1 tablet by mouth Daily.      Omega-3 Fatty Acids (FISH OIL) 1000 MG capsule capsule Take 1 capsule by mouth Daily With Breakfast.      Probiotic Product (PROBIOTIC DAILY PO) Take 1 tablet by mouth Daily.      QUERCETIN PO Take 1 tablet by mouth Daily.      SUPER B COMPLEX/C PO Take 1 tablet by mouth Daily.       No facility-administered medications prior to visit.     No opioid medication identified on active medication list. I have reviewed chart for other potential  high risk medication/s and harmful drug interactions in the elderly.      Aspirin is not on active medication list.  Aspirin use is not indicated based on review of current medical condition/s. Risk of harm outweighs potential benefits.  .    Patient Active Problem List   Diagnosis  "   Premature ventricular contractions (PVCs) (VPCs)    Rash    Chronic allergic rhinitis    Internal nasal lesion    Hordeolum internum    Annual physical exam    Acute left-sided low back pain without sciatica    Mixed hyperlipidemia    COVID-19 virus infection    Right lower quadrant pain    Other fatigue    Vitamin D deficiency    Iron deficiency anemia    Duodenal ulcer    Encounter for subsequent annual wellness visit in Medicare patient    Anxiety    Wheezing    History of tobacco abuse     Advance Care Planning Advance Directive is not on file.  ACP discussion was held with the patient during this visit. Patient does not have an advance directive, information provided.        Review of Systems   Constitutional:  Negative for activity change, appetite change, diaphoresis, fatigue, unexpected weight gain and unexpected weight loss.   HENT:  Negative for hearing loss.    Eyes:  Negative for visual disturbance.   Respiratory:  Negative for chest tightness and shortness of breath.    Cardiovascular:  Negative for chest pain, palpitations and leg swelling.   Gastrointestinal:  Negative for abdominal pain, blood in stool, GERD and indigestion.   Endocrine: Negative for cold intolerance and heat intolerance.   Genitourinary:  Negative for dysuria and hematuria.   Musculoskeletal:  Negative for arthralgias and myalgias.   Skin:  Negative for skin lesions.   Neurological:  Negative for tremors, seizures, syncope, speech difficulty, weakness, headache, memory problem and confusion.   Hematological:  Does not bruise/bleed easily.   Psychiatric/Behavioral:  Positive for sleep disturbance. Negative for depressed mood. The patient is not nervous/anxious.    Objective   Vitals:    04/24/25 1022   BP: (!) 88/56   BP Location: Left arm   Patient Position: Sitting   Cuff Size: Adult   Pulse: 72   Temp: 98.6 °F (37 °C)   TempSrc: Temporal   Weight: 51.7 kg (114 lb)   Height: 162 cm (63.78\")   PainSc: 0-No pain       Estimated " "body mass index is 19.7 kg/m² as calculated from the following:    Height as of this encounter: 162 cm (63.78\").    Weight as of this encounter: 51.7 kg (114 lb).    BMI is within normal parameters. No other follow-up for BMI required.    Gait and Balance Evaluation:  Normal        Does the patient have evidence of cognitive impairment? No       ECG 12 Lead    Date/Time: 2025 12:34 PM  Performed by: Agueda Scott PA-C    Authorized by: Agueda Scott PA-C  Comparison: compared with previous ECG   Rhythm: sinus bradycardia  BPM: 50    Clinical impression: normal ECG  Comments: Sinus bradycardia with ventricular rate of 50 bpm                                                                                              Health  Risk Assessment    Smoking Status:  Social History     Tobacco Use   Smoking Status Former    Current packs/day: 0.00    Average packs/day: 0.5 packs/day for 9.0 years (4.5 ttl pk-yrs)    Types: Cigarettes    Start date: 1972    Quit date: 1981    Years since quittin.3   Smokeless Tobacco Never     Alcohol Consumption:  Social History     Substance and Sexual Activity   Alcohol Use Yes    Alcohol/week: 2.0 standard drinks of alcohol    Types: 2 Cans of beer per week    Comment: rare       Fall Risk Screen  STEADI Fall Risk Assessment was completed, and patient is at LOW risk for falls.Assessment completed on:2025    Depression Screening   Little interest or pleasure in doing things? Not at all   Feeling down, depressed, or hopeless? Not at all   PHQ-2 Total Score 0      Health Habits and Functional and Cognitive Screenin/23/2025     5:42 PM   Functional & Cognitive Status   Do you have difficulty preparing food and eating? No   Do you have difficulty bathing yourself, getting dressed or grooming yourself? No   Do you have difficulty using the toilet? No   Do you have difficulty moving around from place to place? No   Do you have trouble with steps " or getting out of a bed or a chair? No   Current Diet Well Balanced Diet   Dental Exam Up to date   Eye Exam Up to date   Exercise (times per week) 5 times per week   Current Exercises Include Cardiovascular Workout;House Cleaning;Light Weights;Swim Aerobics Class;Yard Work   Do you need help using the phone?  No   Are you deaf or do you have serious difficulty hearing?  No   Do you need help to go to places out of walking distance? No   Do you need help shopping? No   Do you need help preparing meals?  No   Do you need help with housework?  No   Do you need help with laundry? No   Do you need help taking your medications? No   Do you need help managing money? No   Do you ever drive or ride in a car without wearing a seat belt? No   Have you felt unusual stress, anger or loneliness in the last month? No   Who do you live with? Spouse   If you need help, do you have trouble finding someone available to you? No   Have you been bothered in the last four weeks by sexual problems? No   Do you have difficulty concentrating, remembering or making decisions? No           Age-appropriate Screening Schedule:  Refer to the list below for future screening recommendations based on patient's age, sex and/or medical conditions. Orders for these recommended tests are listed in the plan section. The patient has been provided with a written plan.    Health Maintenance List  Health Maintenance   Topic Date Due    ZOSTER VACCINE (1 of 2) Never done    DXA SCAN  01/23/2025    COVID-19 Vaccine (5 - 2024-25 season) 04/08/2025    ANNUAL WELLNESS VISIT  04/18/2025    INFLUENZA VACCINE  07/01/2025    TDAP/TD VACCINES (2 - Td or Tdap) 01/05/2026    LIPID PANEL  01/14/2026    MAMMOGRAM  01/23/2027    COLORECTAL CANCER SCREENING  12/15/2033    HEPATITIS C SCREENING  Completed    Pneumococcal Vaccine 50+  Completed                                                                                                                                                 CMS Preventative Services Quick Reference  Risk Factors Identified During Encounter  None Identified    The above risks/problems have been discussed with the patient.  Pertinent information has been shared with the patient in the After Visit Summary.  An After Visit Summary and PPPS were made available to the patient.    Follow Up:   Next Medicare Wellness visit to be scheduled in 1 year.           Additional E&M Note during same encounter follows:  Patient has additional, significant, and separately identifiable condition(s)/problem(s) that require work above and beyond the Medicare Wellness Visit     Chief Complaint  Medicare Wellness-subsequent    Subjective    HPI  Ny is also being seen today for an annual adult preventative physical exam.        The patient presents for a Medicare wellness visit.    She reports experiencing sleep disturbances, which she attributes to overexertion. Despite identifying a potential solution, she continues to seek an alternative to trazodone, which was ineffective. Occasionally, half a dose of Xanax is taken, though she acknowledges this is not ideal. A prescription for hydroxyzine was never picked up due to a pharmacy error. Benadryl has been beneficial without causing next-day drowsiness, although it results in dryness. Magnesium glycinate is taken for relaxation, particularly after intense exercise or sugar consumption.    Supplementation with iron and vitamin D is reported, and meat intake has recently increased. Consideration is being given to adding a multivitamin to her regimen. No joint issues or difficulty swallowing are reported. A dog sleeps outside her bedroom door, and she sometimes jumps into his bed. Physical health is reported as worse compared to the previous year, while mental health is reported as better. No hospitalizations have occurred within the past year, and aspirin is not taken daily. An advanced care directive is not in place. No falls have  "occurred in the past year, and no cognitive impairment is reported. An active lifestyle is maintained, with no hearing or vision problems. Both the COVID-19 and influenza vaccines have been received, but the shingles vaccine has not yet been administered.     The last colonoscopy was performed on 12/15/2023, with normal results. A bone density scan is scheduled for tomorrow. The most recent mammogram was conducted in 01/2025, with satisfactory results. Blood work in 01/2025 revealed elevated iron levels. Vitamin D supplementation is ongoing.    SOCIAL HISTORY  She drinks very little alcohol. She does not smoke.    FAMILY HISTORY  Her father had low blood pressure.  Review of Systems   Constitutional:  Negative for activity change, appetite change, diaphoresis, fatigue, unexpected weight gain and unexpected weight loss.   HENT:  Negative for hearing loss.    Eyes:  Negative for visual disturbance.   Respiratory:  Negative for chest tightness and shortness of breath.    Cardiovascular:  Negative for chest pain, palpitations and leg swelling.   Gastrointestinal:  Negative for abdominal pain, blood in stool, GERD and indigestion.   Endocrine: Negative for cold intolerance and heat intolerance.   Genitourinary:  Negative for dysuria and hematuria.   Musculoskeletal:  Negative for arthralgias and myalgias.   Skin:  Negative for skin lesions.   Neurological:  Negative for tremors, seizures, syncope, speech difficulty, weakness, headache, memory problem and confusion.   Hematological:  Does not bruise/bleed easily.   Psychiatric/Behavioral:  Positive for sleep disturbance. Negative for depressed mood. The patient is not nervous/anxious.         Objective   Vital Signs:  BP (!) 88/56 (BP Location: Left arm, Patient Position: Sitting, Cuff Size: Adult)   Pulse 72   Temp 98.6 °F (37 °C) (Temporal)   Ht 162 cm (63.78\")   Wt 51.7 kg (114 lb)   BMI 19.70 kg/m²   Physical Exam      Mouth/Throat: Mucous membranes moist, no " erythema, no exudate  Respiratory: Clear to auscultation, no wheezing, rales or rhonchi  Gastrointestinal: Soft, no tenderness, no distention, no masses  Musculoskeletal: No joint or muscular abnormalities noted    The following data was reviewed by: Agueda Scott PA-C on 04/24/2025:    Common labs          1/14/2025    11:59   Common Labs   Glucose 93    BUN 21    Creatinine 0.79    Sodium 137    Potassium 4.5    Chloride 98    Calcium 10.2    Albumin 4.5    Total Bilirubin 0.5    Alkaline Phosphatase 100    AST (SGOT) 26    ALT (SGPT) 20    WBC 5.27    Hemoglobin 14.6    Hematocrit 43.1    Platelets 233    Total Cholesterol 204    Triglycerides 39    HDL Cholesterol 80    LDL Cholesterol  117    Hemoglobin A1C 5.60        Results  Labs   - Iron levels: 1, High   - Vitamin D level: 01/2025, 50   - B12: 01/2025, Normal   - Thyroid levels: 01/2025, Normal   - HDL: 01/2025, 80   - LDL: 01/2025, 117   - A1c: 01/2025, 5.6%   - Blood counts: 01/2025, Optimal    Imaging   - Ultrasound: 01/2025, Normal    Diagnostic Testing   - EKG: Normal   - Colonoscopy: 12/15/2023, Normal           Assessment and Plan Additional age appropriate preventative wellness advice topics were discussed during today's preventative wellness exam.     1. Sleep disturbances.  - Reports difficulty sleeping, especially after periods of high activity or stress. Trazodone was previously ineffective, and she occasionally uses Xanax, which is not recommended.  - Hydroxyzine will be prescribed as it is similar to Benadryl, which she has found helpful in the past.  - Advised to take magnesium glycinate in the afternoon for relaxation and sleep enhancement.  - Prescription for hydroxyzine will be sent to [facility].    2. Elevated iron levels.  - Iron levels were elevated in the last blood work.  - Advised to discontinue iron supplements as excessive iron can be toxic to the liver.  - An iron profile will be ordered to reassess current levels.  -  Previous iron supplementation likely contributed to elevated levels.    3. Low blood pressure.  - Blood pressure readings are consistently low, with today's reading at 90/60 mmHg.  - Advised to monitor blood pressure regularly.  - If symptoms of low blood pressure such as dizziness or fatigue occur, further evaluation will be necessary.  - Blood pressure has been as low as 88/56 mmHg in the past.    4. Health maintenance.  - Received COVID-19 and flu vaccines but has not yet received the shingles vaccine.  - Last colonoscopy was on 12/15/2023, with normal results, and the next one is due in 10 years.  - Bone density scan is scheduled for tomorrow.  - Mammogram in January was normal.  - Advised to get the shingles vaccine at the pharmacy as Medicare no longer covers it.          I have seen Miriam on this date of service:preparing for the visit, reviewing tests, obtaining and/or reviewing a separately obtained history, performing a medically appropriate examination and/or evaluation , counseling and educating the patient/family/caregiver, ordering medications, tests, or procedures, referring and communicating with other health care professionals , and documenting information in the medical record  Follow Up   No follow-ups on file.  Patient was given instructions and counseling regarding her condition or for health maintenance advice. Please see specific information pulled into the AVS if appropriate.  Patient or patient representative verbalized consent for the use of Ambient Listening during the visit with  Agueda Scott PA-C for chart documentation. 4/24/2025  12:36 EDT

## 2025-04-24 NOTE — PATIENT INSTRUCTIONS
Medicare Wellness  Personal Prevention Plan of Service     Date of Office Visit:    Encounter Provider:  Agueda Scott PA-C  Place of Service:  [facility]  Patient Name: Miriam Godoy  :  1960    As part of the Medicare Wellness portion of your visit today, we are providing you with this personalized preventive plan of services (PPPS). This plan is based upon recommendations of the [place] Preventive Services Task Force (USPSTF) and the Advisory Committee on Immunization Practices (ACIP).    This lists the preventive care services that should be considered, and provides dates of when you are due. Items listed as completed are up-to-date and do not require any further intervention.    Health Maintenance   Topic Date Due    DXA SCAN  2025    COVID-19 Vaccine ( season) 2025 (Originally 2025)    ZOSTER VACCINE (1 of 2) 2025 (Originally 2008)    INFLUENZA VACCINE  2025    TDAP/TD VACCINES (2 - Td or Tdap) 2026    LIPID PANEL  2026    ANNUAL WELLNESS VISIT  2026    MAMMOGRAM  2027    COLORECTAL CANCER SCREENING  12/15/2033    HEPATITIS C SCREENING  Completed    Pneumococcal Vaccine 50+  Completed       Orders Placed This Encounter   Procedures    Iron Profile     Standing Status:   Future     Expected Date:   2025     Expiration Date:   2026     Release to patient:   Routine Release [[id number]]       No follow-ups on file.        BMI for Adults  Body mass index (BMI) is a number found using a person's weight and height. BMI can help tell how much of a person's weight is made up of fat. BMI does not measure body fat directly. It is used instead of tests that directly measure body fat, which can be difficult and expensive.  What are BMI measurements used for?  BMI is useful to:  Find out if your weight puts you at higher risk for medical problems.  Help recommend changes, such as in  "diet and exercise. This can help you reach a healthy weight. BMI screening can be done again to see if these changes are working.  How is BMI calculated?  Your height and weight are measured. The BMI is found from those numbers. This can be done with U[place] or metric measurements. Note that charts and online BMI calculators are available to help you find your BMI quickly and easily without doing these calculations.  To calculate your BMI in U.[place] measurements:  Measure your weight in pounds (lb).  Multiply the number of pounds by 703.  So, for an adult who weighs 150 lb, multiply that number by 703: 150 x 703, which equals 105,450.  Measure your height in inches. Then multiply that number by itself to get a measurement called \"inches squared.\"  So, for an adult who is 70 inches tall, the \"inches squared\" measurement is 70 inches x 70 inches, which equals 4,900 inches squared.  Divide the total from step 2 (number of lb x 703) by the total from step 3 (inches squared): 105,450 ÷ 4,900 = 21.5. This is your BMI.  To calculate your BMI in metric measurements:    Measure your weight in kilograms (kg).  For this example, the weight is 70 kg.  Measure your height in meters (m). Then multiply that number by itself to get a measurement called \"meters squared.\"  So, for an adult who is 1.75 m tall, the \"meters squared\" measurement is 1.75 m x 1.75 m, which equals 3.1 meters squared.  Divide the number of kilograms (your weight) by the meters squared number. In this example: 70 ÷ 3.1 = 22.6. This is your BMI.  What do the results mean?  BMI charts are used to see if you are underweight, normal weight, overweight, or obese. The following guidelines will be used:  Underweight: BMI less than 18.5.  Normal weight: BMI between 18.5 and 24.9.  Overweight: BMI between 25 and 29.9.  Obese: BMI of 30 or above.  BMI is a tool and cannot diagnose a condition. Talk with your health care provider about what your BMI means for you. Keep these " notes in mind:  Weight includes fat and muscle. Someone with a muscular build, such as an athlete, may have a BMI that is higher than 24.9. In cases like these, BMI is not a correct measure of body fat.  If you have a BMI of 25 or higher, your provider may need to do more testing to find out if excess body fat is the cause.  BMI is measured the same way for males and females. Females usually have more body fat than males of the same height and weight.  Where to find more information  For more information about BMI, including tools to quickly find your BMI, go to:  Centers for Disease Control and Prevention: cdc.gov  American Heart Association: heart.org  National Heart, Lung, and Blood [place]: nhlbi.nih.gov  This information is not intended to replace advice given to you by your health care provider. Make sure you discuss any questions you have with your health care provider.  Document Revised: 09/07/2023 Document Reviewed: 08/31/2023  LS9 Patient Education © 2024 [employer]Advance Directive    Advance directives are legal papers that state your wishes about health care decisions. They let your wishes be known to family, friends, and health care providers if you become unable to speak for yourself.   You should write these papers out over time rather than all at once. They can be changed and updated at any time.  The types of advance directives include:  Medical power of  (POA).  Living hazel.  Do not resuscitate (DNR) or do not attempt resuscitation (DNAR) orders.  What are a health care proxy and medical POA?  A health care proxy is also called a health care agent. It's a person you choose to make medical decisions for you when you can't make them for yourself. In most cases, a proxy is a trusted friend or family member.  A medical POA is legal paperwork that names your proxy. It may need to be:  Signed.  Notarized.  Dated.  Copied.  Witnessed.  Added to your medical record.  You may also want  to choose someone to handle your money if you can't do so. This is called a durable POA for finances. It's separate from a medical POA. You may choose your health care proxy or someone else to act as your agent in money matters.  If you don't have a proxy, or if the proxy may not be acting in your best interest, a court may choose a guardian to act on your behalf.  What is a living will?  A living will is legal paperwork that states your wishes about medical care. Providers should keep a copy of it in your medical record. You may want to give a copy to family members or friends. You can also keep a card in your wallet to let loved ones know you have a living will and where they can find it. A living will may be used if:  You're very sick with something that will end your life.  You become disabled.  You can't make decisions or speak for yourself.  Your living will should include whether:  To use or not use life support equipment. This may include machines to filter your blood or to help you breathe.  You want a DNR or DNAR order. This tells providers not to use CPR if your heart or breathing stops.  To use or not use tube feeding.  You want to be given foods and fluids.  You want a type of comfort care called palliative care. This may be given when the goal for treatment becomes comfort rather than a cure.  You want to donate your organs and tissues.  A living will doesn't say what to do with your money and property if you pass away.  What is a DNR or DNAR?  A DNR or DNAR order is a request not to have CPR. If you don't have one of these orders, a provider will try to help you if your heart stops or you stop breathing.   If you plan to have surgery, talk with your provider about your DNR or DNAR order.  What happens if I don't have an advance directive?  Each state has its own laws about advance directives. Some states assign family decision makers to act on your behalf if you don't have an advance directive.    Check with your provider, , or state representative about the laws in your state.  Where to find more information  Each state has its own laws about advance directives. You can look up these laws at: [place] [employer].org  This information is not intended to replace advice given to you by your health care provider. Make sure you discuss any questions you have with your health care provider.  Document Revised: 05/06/2024 Document Reviewed: 05/06/2024  Elsevier Patient Education © 2024 [employer].

## 2025-04-25 ENCOUNTER — HOSPITAL ENCOUNTER (OUTPATIENT)
Dept: BONE DENSITY | Facility: HOSPITAL | Age: 65
Discharge: HOME | End: 2025-04-25
Payer: MEDICARE

## 2025-04-25 DIAGNOSIS — Z78.0 POSTMENOPAUSAL: ICD-10-CM

## 2025-04-25 PROCEDURE — 77080 DXA BONE DENSITY AXIAL: CPT
